# Patient Record
Sex: FEMALE | Race: WHITE | NOT HISPANIC OR LATINO | Employment: UNEMPLOYED | URBAN - METROPOLITAN AREA
[De-identification: names, ages, dates, MRNs, and addresses within clinical notes are randomized per-mention and may not be internally consistent; named-entity substitution may affect disease eponyms.]

---

## 2023-02-22 PROBLEM — R51.9 HEADACHE: Status: ACTIVE | Noted: 2023-02-22

## 2023-02-22 RX ORDER — VIT C/E/ZN/COPPR/LUTEIN/ZEAXAN 250MG-90MG
CAPSULE ORAL
COMMUNITY
Start: 2020-05-06

## 2023-02-22 RX ORDER — ZINC GLUCONATE 50 MG
TABLET ORAL
COMMUNITY
Start: 2020-05-07

## 2023-02-22 RX ORDER — DOXYLAMINE SUCCINATE AND PYRIDOXINE HYDROCHLORIDE, DELAYED RELEASE TABLETS 10 MG/10 MG 10; 10 MG/1; MG/1
TABLET, DELAYED RELEASE ORAL
COMMUNITY
Start: 2020-05-06 | End: 2023-12-31 | Stop reason: HOSPADM

## 2023-03-09 ENCOUNTER — APPOINTMENT (OUTPATIENT)
Dept: PRIMARY CARE | Facility: CLINIC | Age: 33
End: 2023-03-09
Payer: COMMERCIAL

## 2023-03-23 ENCOUNTER — OFFICE VISIT (OUTPATIENT)
Dept: PRIMARY CARE | Facility: CLINIC | Age: 33
End: 2023-03-23
Payer: COMMERCIAL

## 2023-03-23 VITALS
DIASTOLIC BLOOD PRESSURE: 77 MMHG | SYSTOLIC BLOOD PRESSURE: 122 MMHG | OXYGEN SATURATION: 97 % | WEIGHT: 259 LBS | HEIGHT: 67 IN | BODY MASS INDEX: 40.65 KG/M2 | HEART RATE: 81 BPM

## 2023-03-23 DIAGNOSIS — Z13.220 LIPID SCREENING: ICD-10-CM

## 2023-03-23 DIAGNOSIS — I83.93 VARICOSE VEINS OF BOTH LOWER EXTREMITIES, UNSPECIFIED WHETHER COMPLICATED: ICD-10-CM

## 2023-03-23 DIAGNOSIS — L72.9 BENIGN CYST OF SKIN: ICD-10-CM

## 2023-03-23 DIAGNOSIS — E66.01 CLASS 3 SEVERE OBESITY WITHOUT SERIOUS COMORBIDITY WITH BODY MASS INDEX (BMI) OF 40.0 TO 44.9 IN ADULT, UNSPECIFIED OBESITY TYPE (MULTI): Primary | ICD-10-CM

## 2023-03-23 DIAGNOSIS — R73.01 IFG (IMPAIRED FASTING GLUCOSE): ICD-10-CM

## 2023-03-23 DIAGNOSIS — D23.4 BENIGN NEOPLASM OF SCALP: ICD-10-CM

## 2023-03-23 DIAGNOSIS — D64.9 ANEMIA, UNSPECIFIED TYPE: ICD-10-CM

## 2023-03-23 LAB
CHOLESTEROL (MG/DL) IN SER/PLAS: 200 MG/DL (ref 0–199)
CHOLESTEROL IN HDL (MG/DL) IN SER/PLAS: 53.4 MG/DL
CHOLESTEROL/HDL RATIO: 3.7
ERYTHROCYTE DISTRIBUTION WIDTH (RATIO) BY AUTOMATED COUNT: 13.9 % (ref 11.5–14.5)
ERYTHROCYTE MEAN CORPUSCULAR HEMOGLOBIN CONCENTRATION (G/DL) BY AUTOMATED: 30 G/DL (ref 32–36)
ERYTHROCYTE MEAN CORPUSCULAR VOLUME (FL) BY AUTOMATED COUNT: 86 FL (ref 80–100)
ERYTHROCYTES (10*6/UL) IN BLOOD BY AUTOMATED COUNT: 5.05 X10E12/L (ref 4–5.2)
ESTIMATED AVERAGE GLUCOSE FOR HBA1C: 94 MG/DL
FERRITIN (UG/LL) IN SER/PLAS: 46 UG/L (ref 8–150)
HEMATOCRIT (%) IN BLOOD BY AUTOMATED COUNT: 43.6 % (ref 36–46)
HEMOGLOBIN (G/DL) IN BLOOD: 13.1 G/DL (ref 12–16)
HEMOGLOBIN A1C/HEMOGLOBIN TOTAL IN BLOOD: 4.9 %
IRON (UG/DL) IN SER/PLAS: 81 UG/DL (ref 35–150)
IRON BINDING CAPACITY (UG/DL) IN SER/PLAS: 378 UG/DL (ref 240–445)
IRON SATURATION (%) IN SER/PLAS: 21 % (ref 25–45)
LDL: 121 MG/DL (ref 0–99)
LEUKOCYTES (10*3/UL) IN BLOOD BY AUTOMATED COUNT: 7.9 X10E9/L (ref 4.4–11.3)
NRBC (PER 100 WBCS) BY AUTOMATED COUNT: 0 /100 WBC (ref 0–0)
PLATELETS (10*3/UL) IN BLOOD AUTOMATED COUNT: 227 X10E9/L (ref 150–450)
TRANSFERRIN (MG/DL) IN SER/PLAS: 271 MG/DL (ref 200–360)
TRIGLYCERIDE (MG/DL) IN SER/PLAS: 129 MG/DL (ref 0–149)
VLDL: 26 MG/DL (ref 0–40)

## 2023-03-23 PROCEDURE — 84443 ASSAY THYROID STIM HORMONE: CPT

## 2023-03-23 PROCEDURE — 84466 ASSAY OF TRANSFERRIN: CPT

## 2023-03-23 PROCEDURE — 83036 HEMOGLOBIN GLYCOSYLATED A1C: CPT

## 2023-03-23 PROCEDURE — 99204 OFFICE O/P NEW MOD 45 MIN: CPT | Performed by: INTERNAL MEDICINE

## 2023-03-23 PROCEDURE — 82728 ASSAY OF FERRITIN: CPT

## 2023-03-23 PROCEDURE — 1036F TOBACCO NON-USER: CPT | Performed by: INTERNAL MEDICINE

## 2023-03-23 PROCEDURE — 3008F BODY MASS INDEX DOCD: CPT | Performed by: INTERNAL MEDICINE

## 2023-03-23 PROCEDURE — 83540 ASSAY OF IRON: CPT

## 2023-03-23 PROCEDURE — 36415 COLL VENOUS BLD VENIPUNCTURE: CPT | Performed by: INTERNAL MEDICINE

## 2023-03-23 PROCEDURE — 85027 COMPLETE CBC AUTOMATED: CPT

## 2023-03-23 PROCEDURE — 80061 LIPID PANEL: CPT

## 2023-03-23 NOTE — PROGRESS NOTES
"Subjective   Patient ID: Lenora Mchugh is a 32 y.o. female who presents for Establish Care (New patient here to establish care).    HPI   C/O difficulties losing weight, exercises regularly.  \"I am not interested in injectable medications\".  Inquiring about \"bumps\" on the sculp and on the back.  Reports varicose veins in lower extremities, requesting compression stocking order.    Review of Systems  Weight gain  Scalp and back bumps.  Varicose veins    Objective   Pulse 81   Ht 1.702 m (5' 7\")   Wt 117 kg (259 lb)   LMP 02/23/2023 (Approximate)   SpO2 97%   BMI 40.57 kg/m²     Physical Exam  NAD. Cooperative.  Head: non tender nodule, hard in consistency, round, well defined, sub centimeter right parietal area  Similar, smaller in size nodule palpable below the scapula on the left side.  Neck: WNL  Lungs CTA  Heart: RRR  Abdomen: WNL  Musculoskeletal system: WNL  Neurologic exam: WNL    Assessment/Plan   Diagnoses and all orders for this visit:  Class 3 severe obesity without serious comorbidity with body mass index (BMI) of 40.0 to 44.9 in adult, unspecified obesity type (CMS/HCC)  -     TSH; Future  -     TSH; Future  Varicose veins of both lower extremities, unspecified whether complicated  -     Compression Stockings 15-20 mmHg  Anemia, unspecified type  -     CBC; Future  -     Iron and TIBC; Future  -     Ferritin; Future  -     Transferrin; Future  IFG (impaired fasting glucose)  -     Hemoglobin A1C; Future  Lipid screening  -     Lipid panel; Future  Discussed health benefits from whole food plant based diet. Bibliotherapy: The [place] Study, VICTORINA Covarrubias, PhD, suggested   Discussed weight goals, recommended gradual weight loss by daily caloric reduction and low to moderate intensity exercise as tolerated, optimum BMI is 27 or 175 lb, 84 lb.    Benign sculp and skin cysts, reassurance.     "

## 2023-03-24 LAB — THYROTROPIN (MIU/L) IN SER/PLAS BY DETECTION LIMIT <= 0.05 MIU/L: 2.38 MIU/L (ref 0.44–3.98)

## 2023-05-23 LAB
CHLAMYDIA TRACH., AMPLIFIED: NEGATIVE
N. GONORRHEA, AMPLIFIED: NEGATIVE
URINE CULTURE: NORMAL

## 2023-06-13 ENCOUNTER — APPOINTMENT (OUTPATIENT)
Dept: LAB | Facility: LAB | Age: 33
End: 2023-06-13
Payer: COMMERCIAL

## 2023-06-13 LAB
ABO GROUP (TYPE) IN BLOOD: NORMAL
ANTIBODY SCREEN: NORMAL
CALCIDIOL (25 OH VITAMIN D3) (NG/ML) IN SER/PLAS: 52 NG/ML
ERYTHROCYTE DISTRIBUTION WIDTH (RATIO) BY AUTOMATED COUNT: 13.6 % (ref 11.5–14.5)
ERYTHROCYTE MEAN CORPUSCULAR HEMOGLOBIN CONCENTRATION (G/DL) BY AUTOMATED: 32.4 G/DL (ref 32–36)
ERYTHROCYTE MEAN CORPUSCULAR VOLUME (FL) BY AUTOMATED COUNT: 82 FL (ref 80–100)
ERYTHROCYTES (10*6/UL) IN BLOOD BY AUTOMATED COUNT: 4.95 X10E12/L (ref 4–5.2)
ESTIMATED AVERAGE GLUCOSE FOR HBA1C: 100 MG/DL
HEMATOCRIT (%) IN BLOOD BY AUTOMATED COUNT: 40.4 % (ref 36–46)
HEMOGLOBIN (G/DL) IN BLOOD: 13.1 G/DL (ref 12–16)
HEMOGLOBIN A1C/HEMOGLOBIN TOTAL IN BLOOD: 5.1 %
HEPATITIS B VIRUS SURFACE AG PRESENCE IN SERUM: NONREACTIVE
HEPATITIS C VIRUS AB PRESENCE IN SERUM: NONREACTIVE
HIV 1/ 2 AG/AB SCREEN: NONREACTIVE
LEUKOCYTES (10*3/UL) IN BLOOD BY AUTOMATED COUNT: 8 X10E9/L (ref 4.4–11.3)
PLATELETS (10*3/UL) IN BLOOD AUTOMATED COUNT: 197 X10E9/L (ref 150–450)
REFLEX ADDED, ANEMIA PANEL: NORMAL
RH FACTOR: NORMAL
RUBELLA VIRUS IGG AB: POSITIVE
SYPHILIS TOTAL AB: NONREACTIVE

## 2023-06-13 PROCEDURE — 83020 HEMOGLOBIN ELECTROPHORESIS: CPT | Performed by: PATHOLOGY

## 2023-06-14 LAB
CHLAMYDIA TRACH., AMPLIFIED: NEGATIVE
N. GONORRHEA, AMPLIFIED: NEGATIVE

## 2023-06-16 LAB
HEMOGLOBIN A2: 2.7 %
HEMOGLOBIN A: 97 %
HEMOGLOBIN F: 0.3 %
HEMOGLOBIN IDENTIFICATION INTERPRETATION: NORMAL
PATH REVIEW-HGB IDENTIFICATION: NORMAL

## 2023-11-30 ENCOUNTER — ROUTINE PRENATAL (OUTPATIENT)
Dept: OBSTETRICS AND GYNECOLOGY | Facility: CLINIC | Age: 33
End: 2023-11-30
Payer: COMMERCIAL

## 2023-11-30 VITALS — WEIGHT: 269 LBS | BODY MASS INDEX: 42.13 KG/M2 | SYSTOLIC BLOOD PRESSURE: 130 MMHG | DIASTOLIC BLOOD PRESSURE: 72 MMHG

## 2023-11-30 DIAGNOSIS — Z34.83 ENCOUNTER FOR SUPERVISION OF NORMAL PREGNANCY IN MULTIGRAVIDA IN THIRD TRIMESTER (HHS-HCC): Primary | ICD-10-CM

## 2023-11-30 DIAGNOSIS — Z3A.35 35 WEEKS GESTATION OF PREGNANCY (HHS-HCC): ICD-10-CM

## 2023-11-30 DIAGNOSIS — O21.9 NAUSEA AND VOMITING IN PREGNANCY (HHS-HCC): ICD-10-CM

## 2023-11-30 PROCEDURE — 99213 OFFICE O/P EST LOW 20 MIN: CPT

## 2023-11-30 PROCEDURE — 87081 CULTURE SCREEN ONLY: CPT

## 2023-11-30 RX ORDER — ONDANSETRON 4 MG/1
4 TABLET, FILM COATED ORAL EVERY 12 HOURS PRN
Qty: 6 TABLET | Refills: 0 | Status: SHIPPED | OUTPATIENT
Start: 2023-11-30 | End: 2023-12-03

## 2023-11-30 NOTE — PROGRESS NOTES
Assessment/Plan   Diagnoses and all orders for this visit:  Encounter for supervision of normal pregnancy in multigravida in third trimester  35 weeks gestation of pregnancy  -     Group B Streptococcus (GBS) Prenatal Screen, Culture  Nausea and vomiting in pregnancy  -     ondansetron (Zofran) 4 mg tablet; Take 1 tablet (4 mg) by mouth every 12 hours if needed for nausea or vomiting for up to 3 days.      Reviewed at home blood sugar and blood pressure monitoring, all WNL  Patient has been doing home monitoring for most of the pregnancy. Has blood pressure cuff, glucometer, and doppler at home. Wants to continue self monitoring until delivery. Encouraged patient to schedule at least one more prenatal visit before ASHLEE.   Discussed routine GBS screening, to be completed today  Reviewed s/sx of PTL, warning signs, fetal movement counts, and when to call provider  Follow up in 1 week for a routine prenatal visit.    Marietta Cade, STEFANIE-RASHAAD    Subjective     Lenora Mchugh is a 33 y.o.  at 35w0d with a working estimated date of delivery of 2024, by Ultrasound who presents for a routine prenatal visit. She denies vaginal bleeding, leakage of fluid, decreased fetal movements, or contractions.  Pt endorses no questions or concerns.     Her pregnancy is complicated by:  Pregnancy Problems (from 23 to present)       No problems associated with this episode.             Objective   Physical Exam:   Weight: 122 kg (269 lb)  Expected Total Weight Gain: 5 kg (11 lb)-9 kg (19 lb)   Pregravid BMI: 40.09  BP: 130/72  Fetal Heart Rate: 144 Fundal Height (cm): 35 cm Presentation: Vertex

## 2023-12-03 LAB — GP B STREP GENITAL QL CULT: NORMAL

## 2023-12-21 ENCOUNTER — ROUTINE PRENATAL (OUTPATIENT)
Dept: OBSTETRICS AND GYNECOLOGY | Facility: CLINIC | Age: 33
End: 2023-12-21
Payer: COMMERCIAL

## 2023-12-21 VITALS — SYSTOLIC BLOOD PRESSURE: 120 MMHG | DIASTOLIC BLOOD PRESSURE: 80 MMHG | WEIGHT: 267 LBS | BODY MASS INDEX: 41.82 KG/M2

## 2023-12-21 DIAGNOSIS — Z34.83 ENCOUNTER FOR SUPERVISION OF NORMAL PREGNANCY IN MULTIGRAVIDA IN THIRD TRIMESTER (HHS-HCC): Primary | ICD-10-CM

## 2023-12-21 DIAGNOSIS — O36.8131 DECREASED FETAL MOVEMENTS IN THIRD TRIMESTER, FETUS 1 OF MULTIPLE GESTATION (HHS-HCC): ICD-10-CM

## 2023-12-21 DIAGNOSIS — Z3A.38 38 WEEKS GESTATION OF PREGNANCY (HHS-HCC): ICD-10-CM

## 2023-12-21 DIAGNOSIS — O26.849 FETAL SIZE INCONSISTENT WITH DATES (HHS-HCC): ICD-10-CM

## 2023-12-21 PROCEDURE — 99213 OFFICE O/P EST LOW 20 MIN: CPT

## 2023-12-21 NOTE — PROGRESS NOTES
Assessment/Plan   Diagnoses and all orders for this visit:  Encounter for supervision of normal pregnancy in multigravida in third trimester  38 weeks gestation of pregnancy  Decreased fetal movements in third trimester, fetus 1 of multiple gestation  Fetal size inconsistent with dates  -     US MAC OB imaging order; Future       surveillance weekly for BMI, patient agreeable to weekly NSTs until delivery    Reviewed s/sx of labor, warning signs, fetal movement counts, and when to call provider  Follow up in 1 week for a routine prenatal visit.    KEYSHA Sherman    Subjective     Lenora Mchugh is a 33 y.o.  at 38w0d with a working estimated date of delivery of 2024, by Ultrasound who presents for a routine prenatal visit. She denies vaginal bleeding, leakage of fluid, decreased fetal movements, or contractions.  Pt complains of:  decreased fetal movement.     Her pregnancy is complicated by:  Pregnancy Problems (from 23 to present)       No problems associated with this episode.             Objective   Physical Exam:   Weight: 121 kg (267 lb)  Expected Total Weight Gain: 5 kg (11 lb)-9 kg (19 lb)   Pregravid BMI: 40.09  BP: 120/80  Fetal Heart Rate: 145 Fundal Height (cm): 34 cm Presentation: Vertex  Dilation: 2 Effacement (%): 50 Fetal Station: -3    NST:   Baseline: 140  Variability: Moderate  Decelerations: None  Accelerations: Present   Reactive    KEYSHA Sherman

## 2023-12-22 ENCOUNTER — TELEPHONE (OUTPATIENT)
Dept: OBSTETRICS AND GYNECOLOGY | Facility: CLINIC | Age: 33
End: 2023-12-22

## 2023-12-22 ENCOUNTER — HOSPITAL ENCOUNTER (OUTPATIENT)
Dept: RADIOLOGY | Facility: HOSPITAL | Age: 33
Discharge: HOME | End: 2023-12-22
Payer: COMMERCIAL

## 2023-12-22 ENCOUNTER — APPOINTMENT (OUTPATIENT)
Dept: RADIOLOGY | Facility: CLINIC | Age: 33
End: 2023-12-22
Payer: COMMERCIAL

## 2023-12-22 ENCOUNTER — TELEMEDICINE (OUTPATIENT)
Dept: MATERNAL FETAL MEDICINE | Facility: HOSPITAL | Age: 33
End: 2023-12-22
Payer: COMMERCIAL

## 2023-12-22 DIAGNOSIS — O36.5990 IUGR (INTRAUTERINE GROWTH RESTRICTION) AFFECTING CARE OF MOTHER (HHS-HCC): Primary | ICD-10-CM

## 2023-12-22 DIAGNOSIS — O26.849 FETAL SIZE INCONSISTENT WITH DATES (HHS-HCC): ICD-10-CM

## 2023-12-22 PROCEDURE — 76816 OB US FOLLOW-UP PER FETUS: CPT

## 2023-12-22 PROCEDURE — 76816 OB US FOLLOW-UP PER FETUS: CPT | Performed by: OBSTETRICS & GYNECOLOGY

## 2023-12-22 PROCEDURE — 76821 MIDDLE CEREBRAL ARTERY ECHO: CPT | Performed by: OBSTETRICS & GYNECOLOGY

## 2023-12-22 PROCEDURE — 76820 UMBILICAL ARTERY ECHO: CPT

## 2023-12-22 PROCEDURE — 76819 FETAL BIOPHYS PROFIL W/O NST: CPT

## 2023-12-22 PROCEDURE — 76819 FETAL BIOPHYS PROFIL W/O NST: CPT | Performed by: OBSTETRICS & GYNECOLOGY

## 2023-12-22 PROCEDURE — 99202 OFFICE O/P NEW SF 15 MIN: CPT | Performed by: OBSTETRICS & GYNECOLOGY

## 2023-12-22 PROCEDURE — 76820 UMBILICAL ARTERY ECHO: CPT | Performed by: OBSTETRICS & GYNECOLOGY

## 2023-12-22 NOTE — PROGRESS NOTES
Ultrasound findings:  Cardiac and facial views have never been completed. BMI 35, no aneuploidy screening with previous SGA infant.  S < D in office.  -Decreased interval fetal growth. The Ac is at the <1%, and the EFW is at the 10% percentile  -No malformations identified on a limited survey  -Normal amniotic fluid volume  -Normal doppler indices with an RI at the 4% percentile  The patient was informed of the above findings. (see consultation note)  Findings today are not suggestive of uteroplacental insufficiency at this time.  Delivery at 39 weeks recommended vs repeat doppler, BPP and AFV evaluation scheduled weekly until delivery.  Thank you for allowing us to participate in the care of your patient    Counseling provided:  The following was discussed with the patient and her partner:  -Fetal growth restriction is a diagnosis based solely on fetal size.  However, true fetal growth restriction would be a fetus not meeting his growth potential.  As growth potential is unknown for each particular fetus, size is used as a inaccurate surrogate.  -As she had a previous FT infant who was of a similar size, this is likely constitutional  -The concept of percentiles and growth patterns were discussed.  Normal growth velocity is a strong indicator of normal outcome. Growth velocity cannot be evaluated on a single scan without a proximate scan  -Sometimes there is evolving uteroplacental insufficiency.  The best test to determine if there is uteroplacental insufficiency is umbilical artery Doppler resistance.  This is repeated on a weekly basis.  -However the patient is at term.  In this situation, delivery at 39 weeks is recommended as the ARRIVE study shows there is not an advantage to waiting.  The risk for  section is not increased.  outcomes are the same, however outcomes worsen after 39 4/7 weeks.  -Delivery does not have to be at a tertiary care center unless there are other complications    The  patient expressed an understanding of the above and agrees with the following plan of management:  Delivery at 39 weeks.      Video consultation was performed with the physician at [facility] and the patient at [facility]

## 2023-12-22 NOTE — TELEPHONE ENCOUNTER
Marietta,    I spoke with pt and she stated she needs another U/S  and BPP. Pt also wanted to discuss stripping of the membranes. Pt informed if any problems before next appt may call back or go to Labor and Delivery. Pt verbalized understanding

## 2023-12-26 ENCOUNTER — TELEPHONE (OUTPATIENT)
Dept: OBSTETRICS AND GYNECOLOGY | Facility: CLINIC | Age: 33
End: 2023-12-26
Payer: COMMERCIAL

## 2023-12-26 ENCOUNTER — HOSPITAL ENCOUNTER (OUTPATIENT)
Dept: RADIOLOGY | Facility: HOSPITAL | Age: 33
Discharge: HOME | End: 2023-12-26
Payer: COMMERCIAL

## 2023-12-26 PROCEDURE — 76819 FETAL BIOPHYS PROFIL W/O NST: CPT | Performed by: OBSTETRICS & GYNECOLOGY

## 2023-12-26 PROCEDURE — 76820 UMBILICAL ARTERY ECHO: CPT | Performed by: OBSTETRICS & GYNECOLOGY

## 2023-12-26 PROCEDURE — 76819 FETAL BIOPHYS PROFIL W/O NST: CPT

## 2023-12-26 NOTE — TELEPHONE ENCOUNTER
Pt verified by name and .  Pt is aware nurse spoke to Marietta Cade regarding IOL.  Pt has appt tomorrow with Marietta Cade.  Pt has no questions at this time.

## 2023-12-27 ENCOUNTER — ROUTINE PRENATAL (OUTPATIENT)
Dept: OBSTETRICS AND GYNECOLOGY | Facility: CLINIC | Age: 33
End: 2023-12-27
Payer: COMMERCIAL

## 2023-12-27 DIAGNOSIS — Z36.4 ULTRASOUND FOR ANTENATAL SCREENING FOR FETAL GROWTH RESTRICTION (HHS-HCC): ICD-10-CM

## 2023-12-27 DIAGNOSIS — Z3A.38 38 WEEKS GESTATION OF PREGNANCY (HHS-HCC): Primary | ICD-10-CM

## 2023-12-27 DIAGNOSIS — O36.5931 MATERNAL CARE FOR POOR FETAL GROWTH IN THIRD TRIMESTER, FETUS 1 OF MULTIPLE GESTATION (HHS-HCC): ICD-10-CM

## 2023-12-27 PROBLEM — O36.5990: Status: ACTIVE | Noted: 2023-12-27

## 2023-12-27 PROCEDURE — 99213 OFFICE O/P EST LOW 20 MIN: CPT

## 2023-12-28 ENCOUNTER — APPOINTMENT (OUTPATIENT)
Dept: OBSTETRICS AND GYNECOLOGY | Facility: HOSPITAL | Age: 33
End: 2023-12-28
Payer: COMMERCIAL

## 2023-12-28 ENCOUNTER — HOSPITAL ENCOUNTER (INPATIENT)
Facility: HOSPITAL | Age: 33
LOS: 3 days | Discharge: HOME | End: 2023-12-31
Attending: OBSTETRICS & GYNECOLOGY
Payer: COMMERCIAL

## 2023-12-28 ENCOUNTER — APPOINTMENT (OUTPATIENT)
Dept: OBSTETRICS AND GYNECOLOGY | Facility: CLINIC | Age: 33
End: 2023-12-28
Payer: COMMERCIAL

## 2023-12-28 DIAGNOSIS — O36.5931 MATERNAL CARE FOR POOR FETAL GROWTH IN THIRD TRIMESTER, FETUS 1 OF MULTIPLE GESTATION (HHS-HCC): ICD-10-CM

## 2023-12-28 PROBLEM — Z34.90 ENCOUNTER FOR INDUCTION OF LABOR (HHS-HCC): Status: ACTIVE | Noted: 2023-12-28

## 2023-12-28 LAB
ABO GROUP (TYPE) IN BLOOD: NORMAL
ALBUMIN SERPL BCP-MCNC: 3.8 G/DL (ref 3.4–5)
ALP SERPL-CCNC: 181 U/L (ref 33–110)
ALT SERPL W P-5'-P-CCNC: 11 U/L (ref 7–45)
ANION GAP SERPL CALC-SCNC: 15 MMOL/L (ref 10–20)
ANTIBODY SCREEN: NORMAL
AST SERPL W P-5'-P-CCNC: 11 U/L (ref 9–39)
BILIRUB SERPL-MCNC: 0.3 MG/DL (ref 0–1.2)
BUN SERPL-MCNC: 7 MG/DL (ref 6–23)
CALCIUM SERPL-MCNC: 9.7 MG/DL (ref 8.6–10.6)
CHLORIDE SERPL-SCNC: 105 MMOL/L (ref 98–107)
CO2 SERPL-SCNC: 23 MMOL/L (ref 21–32)
CREAT SERPL-MCNC: 0.45 MG/DL (ref 0.5–1.05)
CREAT UR-MCNC: 107.3 MG/DL (ref 20–320)
ERYTHROCYTE [DISTWIDTH] IN BLOOD BY AUTOMATED COUNT: 15.5 % (ref 11.5–14.5)
GFR SERPL CREATININE-BSD FRML MDRD: >90 ML/MIN/1.73M*2
GLUCOSE SERPL-MCNC: 74 MG/DL (ref 74–99)
HCT VFR BLD AUTO: 40.3 % (ref 36–46)
HGB BLD-MCNC: 12.6 G/DL (ref 12–16)
LDH SERPL L TO P-CCNC: 144 U/L (ref 84–246)
MCH RBC QN AUTO: 25.3 PG (ref 26–34)
MCHC RBC AUTO-ENTMCNC: 31.3 G/DL (ref 32–36)
MCV RBC AUTO: 81 FL (ref 80–100)
NRBC BLD-RTO: 0 /100 WBCS (ref 0–0)
PLATELET # BLD AUTO: 161 X10*3/UL (ref 150–450)
POTASSIUM SERPL-SCNC: 3.9 MMOL/L (ref 3.5–5.3)
PROT SERPL-MCNC: 6.6 G/DL (ref 6.4–8.2)
PROT UR-ACNC: 20 MG/DL (ref 5–24)
PROT/CREAT UR: 0.19 MG/MG CREAT (ref 0–0.17)
RBC # BLD AUTO: 4.99 X10*6/UL (ref 4–5.2)
RH FACTOR (ANTIGEN D): NORMAL
SODIUM SERPL-SCNC: 139 MMOL/L (ref 136–145)
T PALLIDUM AB SER QL: NONREACTIVE
URATE SERPL-MCNC: 4.5 MG/DL (ref 2.3–6.7)
WBC # BLD AUTO: 10.5 X10*3/UL (ref 4.4–11.3)

## 2023-12-28 PROCEDURE — 84550 ASSAY OF BLOOD/URIC ACID: CPT

## 2023-12-28 PROCEDURE — 86780 TREPONEMA PALLIDUM: CPT

## 2023-12-28 PROCEDURE — 85027 COMPLETE CBC AUTOMATED: CPT

## 2023-12-28 PROCEDURE — 84075 ASSAY ALKALINE PHOSPHATASE: CPT

## 2023-12-28 PROCEDURE — 86901 BLOOD TYPING SEROLOGIC RH(D): CPT

## 2023-12-28 PROCEDURE — 36415 COLL VENOUS BLD VENIPUNCTURE: CPT

## 2023-12-28 PROCEDURE — 1120000001 HC OB PRIVATE ROOM DAILY

## 2023-12-28 PROCEDURE — 83615 LACTATE (LD) (LDH) ENZYME: CPT

## 2023-12-28 PROCEDURE — 2500000004 HC RX 250 GENERAL PHARMACY W/ HCPCS (ALT 636 FOR OP/ED)

## 2023-12-28 PROCEDURE — 82570 ASSAY OF URINE CREATININE: CPT

## 2023-12-28 PROCEDURE — 99223 1ST HOSP IP/OBS HIGH 75: CPT

## 2023-12-28 RX ORDER — METHYLERGONOVINE MALEATE 0.2 MG/ML
0.2 INJECTION INTRAVENOUS ONCE AS NEEDED
Status: DISCONTINUED | OUTPATIENT
Start: 2023-12-28 | End: 2023-12-29

## 2023-12-28 RX ORDER — CARBOPROST TROMETHAMINE 250 UG/ML
250 INJECTION, SOLUTION INTRAMUSCULAR ONCE AS NEEDED
Status: DISCONTINUED | OUTPATIENT
Start: 2023-12-28 | End: 2023-12-29

## 2023-12-28 RX ORDER — SERTRALINE HYDROCHLORIDE 25 MG/1
25 TABLET, FILM COATED ORAL DAILY
COMMUNITY

## 2023-12-28 RX ORDER — OXYTOCIN/0.9 % SODIUM CHLORIDE 30/500 ML
60 PLASTIC BAG, INJECTION (ML) INTRAVENOUS ONCE AS NEEDED
Status: DISCONTINUED | OUTPATIENT
Start: 2023-12-28 | End: 2023-12-29

## 2023-12-28 RX ORDER — LIDOCAINE HYDROCHLORIDE 10 MG/ML
30 INJECTION INFILTRATION; PERINEURAL ONCE AS NEEDED
Status: DISCONTINUED | OUTPATIENT
Start: 2023-12-28 | End: 2023-12-29

## 2023-12-28 RX ORDER — TRANEXAMIC ACID 100 MG/ML
1000 INJECTION, SOLUTION INTRAVENOUS ONCE AS NEEDED
Status: DISCONTINUED | OUTPATIENT
Start: 2023-12-28 | End: 2023-12-29

## 2023-12-28 RX ORDER — METOCLOPRAMIDE HYDROCHLORIDE 5 MG/ML
10 INJECTION INTRAMUSCULAR; INTRAVENOUS EVERY 6 HOURS PRN
Status: DISCONTINUED | OUTPATIENT
Start: 2023-12-28 | End: 2023-12-29

## 2023-12-28 RX ORDER — MISOPROSTOL 200 UG/1
800 TABLET ORAL ONCE AS NEEDED
Status: DISCONTINUED | OUTPATIENT
Start: 2023-12-28 | End: 2023-12-29

## 2023-12-28 RX ORDER — LOPERAMIDE HYDROCHLORIDE 2 MG/1
4 CAPSULE ORAL EVERY 2 HOUR PRN
Status: DISCONTINUED | OUTPATIENT
Start: 2023-12-28 | End: 2023-12-29

## 2023-12-28 RX ORDER — SODIUM CHLORIDE, SODIUM LACTATE, POTASSIUM CHLORIDE, CALCIUM CHLORIDE 600; 310; 30; 20 MG/100ML; MG/100ML; MG/100ML; MG/100ML
125 INJECTION, SOLUTION INTRAVENOUS CONTINUOUS
Status: DISCONTINUED | OUTPATIENT
Start: 2023-12-28 | End: 2023-12-29

## 2023-12-28 RX ORDER — METOCLOPRAMIDE 10 MG/1
10 TABLET ORAL EVERY 6 HOURS PRN
Status: DISCONTINUED | OUTPATIENT
Start: 2023-12-28 | End: 2023-12-29

## 2023-12-28 RX ORDER — ONDANSETRON 4 MG/1
4 TABLET, FILM COATED ORAL EVERY 8 HOURS PRN
COMMUNITY
End: 2023-12-31 | Stop reason: HOSPADM

## 2023-12-28 RX ORDER — TERBUTALINE SULFATE 1 MG/ML
0.25 INJECTION SUBCUTANEOUS ONCE AS NEEDED
Status: DISCONTINUED | OUTPATIENT
Start: 2023-12-28 | End: 2023-12-29

## 2023-12-28 RX ORDER — SERTRALINE HYDROCHLORIDE 25 MG/1
25 TABLET, FILM COATED ORAL DAILY
Status: DISCONTINUED | OUTPATIENT
Start: 2023-12-28 | End: 2023-12-29

## 2023-12-28 RX ORDER — NIFEDIPINE 10 MG/1
10 CAPSULE ORAL ONCE AS NEEDED
Status: DISCONTINUED | OUTPATIENT
Start: 2023-12-28 | End: 2023-12-29

## 2023-12-28 RX ORDER — ONDANSETRON HYDROCHLORIDE 2 MG/ML
4 INJECTION, SOLUTION INTRAVENOUS EVERY 6 HOURS PRN
Status: DISCONTINUED | OUTPATIENT
Start: 2023-12-28 | End: 2023-12-29

## 2023-12-28 RX ORDER — OXYTOCIN 10 [USP'U]/ML
10 INJECTION, SOLUTION INTRAMUSCULAR; INTRAVENOUS ONCE AS NEEDED
Status: DISCONTINUED | OUTPATIENT
Start: 2023-12-28 | End: 2023-12-29

## 2023-12-28 RX ORDER — LABETALOL HYDROCHLORIDE 5 MG/ML
20 INJECTION, SOLUTION INTRAVENOUS ONCE AS NEEDED
Status: DISCONTINUED | OUTPATIENT
Start: 2023-12-28 | End: 2023-12-29

## 2023-12-28 RX ORDER — ONDANSETRON 4 MG/1
4 TABLET, FILM COATED ORAL EVERY 6 HOURS PRN
Status: DISCONTINUED | OUTPATIENT
Start: 2023-12-28 | End: 2023-12-29

## 2023-12-28 RX ORDER — OXYTOCIN/0.9 % SODIUM CHLORIDE 30/500 ML
2-30 PLASTIC BAG, INJECTION (ML) INTRAVENOUS CONTINUOUS
Status: DISCONTINUED | OUTPATIENT
Start: 2023-12-28 | End: 2023-12-29

## 2023-12-28 RX ORDER — HYDRALAZINE HYDROCHLORIDE 20 MG/ML
5 INJECTION INTRAMUSCULAR; INTRAVENOUS ONCE AS NEEDED
Status: DISCONTINUED | OUTPATIENT
Start: 2023-12-28 | End: 2023-12-29

## 2023-12-28 RX ADMIN — SODIUM CHLORIDE, POTASSIUM CHLORIDE, SODIUM LACTATE AND CALCIUM CHLORIDE 125 ML/HR: 600; 310; 30; 20 INJECTION, SOLUTION INTRAVENOUS at 20:13

## 2023-12-28 RX ADMIN — Medication 2 MILLI-UNITS/MIN: at 20:13

## 2023-12-28 RX ADMIN — ONDANSETRON 4 MG: 2 INJECTION INTRAMUSCULAR; INTRAVENOUS at 22:49

## 2023-12-28 SDOH — SOCIAL STABILITY: SOCIAL INSECURITY: DOES ANYONE TRY TO KEEP YOU FROM HAVING/CONTACTING OTHER FRIENDS OR DOING THINGS OUTSIDE YOUR HOME?: NO

## 2023-12-28 SDOH — HEALTH STABILITY: MENTAL HEALTH: SUICIDAL BEHAVIOR (LIFETIME): NO

## 2023-12-28 SDOH — ECONOMIC STABILITY: HOUSING INSECURITY: DO YOU FEEL UNSAFE GOING BACK TO THE PLACE WHERE YOU ARE LIVING?: NO

## 2023-12-28 SDOH — SOCIAL STABILITY: SOCIAL INSECURITY: HAVE YOU HAD THOUGHTS OF HARMING ANYONE ELSE?: NO

## 2023-12-28 SDOH — SOCIAL STABILITY: SOCIAL INSECURITY: HAS ANYONE EVER THREATENED TO HURT YOUR FAMILY OR YOUR PETS?: NO

## 2023-12-28 SDOH — SOCIAL STABILITY: SOCIAL INSECURITY: ARE YOU OR HAVE YOU BEEN THREATENED OR ABUSED PHYSICALLY, EMOTIONALLY, OR SEXUALLY BY ANYONE?: NO

## 2023-12-28 SDOH — SOCIAL STABILITY: SOCIAL INSECURITY: ARE THERE ANY APPARENT SIGNS OF INJURIES/BEHAVIORS THAT COULD BE RELATED TO ABUSE/NEGLECT?: NO

## 2023-12-28 SDOH — HEALTH STABILITY: MENTAL HEALTH: NON-SPECIFIC ACTIVE SUICIDAL THOUGHTS (PAST 1 MONTH): NO

## 2023-12-28 SDOH — SOCIAL STABILITY: SOCIAL INSECURITY: DO YOU FEEL ANYONE HAS EXPLOITED OR TAKEN ADVANTAGE OF YOU FINANCIALLY OR OF YOUR PERSONAL PROPERTY?: NO

## 2023-12-28 SDOH — HEALTH STABILITY: MENTAL HEALTH: WERE YOU ABLE TO COMPLETE ALL THE BEHAVIORAL HEALTH SCREENINGS?: YES

## 2023-12-28 SDOH — HEALTH STABILITY: MENTAL HEALTH: WISH TO BE DEAD (PAST 1 MONTH): NO

## 2023-12-28 SDOH — SOCIAL STABILITY: SOCIAL INSECURITY: PHYSICAL ABUSE: DENIES

## 2023-12-28 SDOH — SOCIAL STABILITY: SOCIAL INSECURITY: VERBAL ABUSE: DENIES

## 2023-12-28 SDOH — SOCIAL STABILITY: SOCIAL INSECURITY: ABUSE SCREEN: ADULT

## 2023-12-28 ASSESSMENT — LIFESTYLE VARIABLES
SKIP TO QUESTIONS 9-10: 1
HOW OFTEN DO YOU HAVE A DRINK CONTAINING ALCOHOL: NEVER
HOW OFTEN DO YOU HAVE 6 OR MORE DRINKS ON ONE OCCASION: NEVER
HOW OFTEN DO YOU HAVE 6 OR MORE DRINKS ON ONE OCCASION: NEVER
AUDIT-C TOTAL SCORE: 0
HOW MANY STANDARD DRINKS CONTAINING ALCOHOL DO YOU HAVE ON A TYPICAL DAY: PATIENT DOES NOT DRINK
AUDIT-C TOTAL SCORE: 0
HOW OFTEN DO YOU HAVE A DRINK CONTAINING ALCOHOL: NEVER

## 2023-12-28 ASSESSMENT — PAIN SCALES - GENERAL
PAINLEVEL_OUTOF10: 3
PAINLEVEL_OUTOF10: 5 - MODERATE PAIN
PAINLEVEL_OUTOF10: 3
PAINLEVEL_OUTOF10: 3
PAINLEVEL_OUTOF10: 2
PAINLEVEL_OUTOF10: 3
PAINLEVEL_OUTOF10: 3
PAINLEVEL_OUTOF10: 2

## 2023-12-28 ASSESSMENT — PATIENT HEALTH QUESTIONNAIRE - PHQ9
1. LITTLE INTEREST OR PLEASURE IN DOING THINGS: NOT AT ALL
1. LITTLE INTEREST OR PLEASURE IN DOING THINGS: NOT AT ALL
SUM OF ALL RESPONSES TO PHQ9 QUESTIONS 1 & 2: 0
2. FEELING DOWN, DEPRESSED OR HOPELESS: NOT AT ALL
2. FEELING DOWN, DEPRESSED OR HOPELESS: NOT AT ALL
SUM OF ALL RESPONSES TO PHQ9 QUESTIONS 1 & 2: 0

## 2023-12-28 ASSESSMENT — ACTIVITIES OF DAILY LIVING (ADL): LACK_OF_TRANSPORTATION: NO

## 2023-12-28 NOTE — CARE PLAN
The patient's goals for the shift include      The clinical goals for the shift include FHR remains reasurring throughout IOL    Over the shift, the patient made progress towards goal, FHR remains reasurring, CRB placed and now out, pt tolerating induction.

## 2023-12-28 NOTE — PROGRESS NOTES
Assessment/Plan   Diagnoses and all orders for this visit:  38 weeks gestation of pregnancy  Ultrasound for  screening for fetal growth restriction  Maternal care for poor fetal growth in third trimester, fetus 1 of multiple gestation  -     Labor Induction; Future  -     Fetal nonstress test; Future      NST reactive, BPP    surveillance weekly for poor fetal growth in the third trimester  Discussed expectant management vs. scheduling term IOL, patient had IOL scheduled for tomorrow at 2pm for fetal growth. Patient is unsure of attendance. Discussed recommendation.    Reviewed s/sx of labor, warning signs, fetal movement counts, and when to call provider    STEFANIE Sherman-RASHAAD Serrano     Lenora Mchugh is a 33 y.o.  at 38w6d with a working estimated date of delivery of 2024, by Ultrasound who presents for a routine prenatal visit. She denies vaginal bleeding, leakage of fluid, decreased fetal movements, or contractions.  Pt endorses no questions or concerns.      Her pregnancy is complicated by:  Pregnancy Problems (from 23 to present)      Decreased interval fetal growth.   The Ac is at the <1%, and the EFW is at the 10% percentile              Objective   Physical Exam:      Expected Total Weight Gain: 5 kg (11 lb)-9 kg (19 lb)   Pregravid BMI: 40.09      Reactive NST    Baseline 140, +accels, -decels. Moderate variability

## 2023-12-28 NOTE — H&P
Obstetrical Admission History and Physical     Lenora Mchugh is a 33 y.o.  at 39w2d. ASHLEE: 2024, by Last Menstrual Period. Estimated fetal weight: 2750g. She has had limited prenatal care with Marietta Cade CNM.    Assessment    Lenora Mchugh is a 33 y.o.  at 39w2d. ASHLEE: 2024, by Last Menstrual Period.   FHT Category 2 - overall reassuring   IOL  - crb in place   Limited prenatal care  Newly diagnosed gHTN r/o preeclampsia   FGR  Obesity       Plan    -Admit to labor and delivery  -Monitor vital signs per unit protocol  -Routine labs ordered, stat HELLP labs added.  -Encourage frequent position changes  -Regular diet, clear liquid diet with epidural  -Continuous fetal monitoring  -Pain management per patient request  -Continue assessment of maternal and fetal well-being  -Recheck as clinically indicted by maternal or fetal status  -Anticipate SVB  -Discussed no 1 hour gtt and no documented blood glucose values during prenatal course with MD team - will monitor POCT every 4 hours and collaborate on result mgt.    - MD team aware of severe range Bps with mild range rechecks, will continue to monitor.  -Dr Piper and aware of admission        STEFANIE Barnhart-RASHAAD    Subjective     Chief Complaint: Encounter for IOL in the setting of FGR     Lenora is a 33 year old  @ 39.2 weeks by LMP c/w 14.2 week ultra sound who presents for IOL in the setting of FGR. Last ultrasound on  EFW was 2750g EFW 10%. AC <1%.  She has limited prenatal care and did not complete second trimester labs. Pt reports she was home monitoring blood glucose values in th second trimester.     Pt has had 2 severe range blood pressure values about 1 hour and 20 min apart with mild range rechecks. She denies HA, visual changes, abd/epigastric pain. She denies a history of HTN during this pregnancy or in previous pregnancies.     Pt has a history of depression for which she is taking daily Zoloft.         Pregnancy Problems  (from 23 to present)       Problem Noted Resolved    Encounter for induction of labor 2023 by KEYSHA Barnhart No    Priority:  Medium      Poor clinical fetal growth 2023 by KEYSHA Sherman No    Priority:  Medium               Obstetrical History   OB History    Para Term  AB Living   5 4 4     4   SAB IAB Ectopic Multiple Live Births           4      # Outcome Date GA Lbr Eder/2nd Weight Sex Delivery Anes PTL Lv   5 Current            4 Term 20    F Vag-Spont None N JOEY   3 Term 17 40w0d   M Vag-Spont None N JOEY   2 Term 02/08/15 40w0d  2750 g M Vag-Spont None N JOEY   1 Term 13 40w0d  3005 g M Vag-Spont None N JOEY       Past Medical History  History reviewed. No pertinent past medical history.     Past Surgical History   History reviewed. No pertinent surgical history.    Social History  Social History     Tobacco Use    Smoking status: Never     Passive exposure: Never    Smokeless tobacco: Never   Substance Use Topics    Alcohol use: Not Currently     Substance and Sexual Activity   Drug Use Not Currently       Allergies  Clindamycin, Nut - unspecified, and Sulfamethoxazole-trimethoprim     Medications  Medications Prior to Admission   Medication Sig Dispense Refill Last Dose    cholecalciferol (Vitamin D-3) 25 MCG (1000 UT) capsule Take by mouth.   More than a month    doxylamine-pyridoxine, vit B6, 10-10 mg tablet,delayed release (DR/EC) Take by mouth.   More than a month    fish oil concentrate (Omega-3) 120-180 mg capsule Take by mouth.   More than a month    L. acidophilus/Bifid. animalis 32 billion cell capsule Take by mouth. CAPS   More than a month    magnesium oxide 500 mg capsule Take by mouth. Take as directed   More than a month    ondansetron (Zofran) 4 mg tablet Take 1 tablet (4 mg) by mouth every 8 hours if needed for nausea or vomiting.   2023    PNV no.95/ferrous fum/folic ac (PRENATAL ORAL) Take by mouth.   More than a month     sertraline (Zoloft) 25 mg tablet Take 1 tablet (25 mg) by mouth once daily.   12/28/2023       Objective    Last Vitals  Temp Pulse Resp BP MAP O2 Sat   36.7 °C (98.1 °F) 78 20 (!) 140/65   97 %     Physical Examination    GENERAL: Examination reveals a well developed, well nourished, gravid female in no acute distress and whose affect is appropriate. She is alert and cooperative.  HEENT: PERRLA. External ears normal. Nose normal, no erythema or discharge. Mouth and throat clear.  NECK: not examined  LUNGS:  normal resp effort   ABDOMEN: soft, gravid, nontender, nondistended, no abnormal masses, no epigastric pain, fundus soft, nontender, size equal dates, FHT present  FHR is  , with Accelerations, and a Category I tracing.    [place] reading:    The fetus is in a vertex presentation, determined by ultrasound, vaginal exam, and Leopold's maneuver  Current Estimated Fetal Weight 2750g established by ultrasound  VAGINA: normal appearing vagina with normal color and discharge and no lesions noted  CERVIX: 1 cm dilated, 50 % effaced, -3 station; MEMBRANES are Intact.   On SSE small cervical polyp noted during CRB placement.   EXTREMITIES: no redness or tenderness in the calves or thighs, no edema, no limitation in range of motion  SKIN: normal coloration and turgor, no rashes  NEUROLOGICAL: alert, oriented, normal speech, no focal findings or movement disorder noted  PSYCHOLOGICAL: awake and alert; oriented to person, place, and time    Fetal Monitoring      Baseline FHR: 140 per minute  Variability: moderate  Accelerations: yes  Decelerations: variable and late - intermittent variable and late x1   TOCO: irregular     Cervical Exam:  1 cm dilated, 50 effaced, -3 station    Membrane status: intact    CRB inserted through cervical os and inflated with 60cc saline @ 1645.   Placement of balloon confirmed with gentle traction.   Patient tolerated well.        Lab Review  Labs in chart were reviewed.  No results found for:  "\"ABO\", \"LABRH\", \"ABSCRN\"  Lab Results   Component Value Date    WBC 10.5 12/28/2023    HGB 12.6 12/28/2023    HCT 40.3 12/28/2023     12/28/2023     No results found for: \"GRPBSTREP\"  Lab Results   Component Value Date    GLUCOSE 74 12/28/2023     12/28/2023    K 3.9 12/28/2023     12/28/2023    CO2 23 12/28/2023    ANIONGAP 15 12/28/2023    BUN 7 12/28/2023    CREATININE 0.45 (L) 12/28/2023    EGFR >90 12/28/2023    CALCIUM 9.7 12/28/2023    ALBUMIN 3.8 12/28/2023    PROT 6.6 12/28/2023    ALKPHOS 181 (H) 12/28/2023    ALT 11 12/28/2023    AST 11 12/28/2023    BILITOT 0.3 12/28/2023       "

## 2023-12-29 ENCOUNTER — ANESTHESIA (OUTPATIENT)
Dept: OBSTETRICS AND GYNECOLOGY | Facility: HOSPITAL | Age: 33
End: 2023-12-29
Payer: COMMERCIAL

## 2023-12-29 ENCOUNTER — ANESTHESIA EVENT (OUTPATIENT)
Dept: OBSTETRICS AND GYNECOLOGY | Facility: HOSPITAL | Age: 33
End: 2023-12-29
Payer: COMMERCIAL

## 2023-12-29 PROCEDURE — 7100000016 HC LABOR RECOVERY PER HOUR

## 2023-12-29 PROCEDURE — 59409 OBSTETRICAL CARE: CPT

## 2023-12-29 PROCEDURE — 1210000001 HC SEMI-PRIVATE ROOM DAILY

## 2023-12-29 PROCEDURE — 2500000005 HC RX 250 GENERAL PHARMACY W/O HCPCS: Performed by: STUDENT IN AN ORGANIZED HEALTH CARE EDUCATION/TRAINING PROGRAM

## 2023-12-29 PROCEDURE — 2500000004 HC RX 250 GENERAL PHARMACY W/ HCPCS (ALT 636 FOR OP/ED): Performed by: ADVANCED PRACTICE MIDWIFE

## 2023-12-29 PROCEDURE — 01967 NEURAXL LBR ANES VAG DLVR: CPT | Performed by: ANESTHESIOLOGY

## 2023-12-29 PROCEDURE — 7210000002 HC LABOR PER HOUR

## 2023-12-29 PROCEDURE — 3E0E77Z INTRODUCTION OF ELECTROLYTIC AND WATER BALANCE SUBSTANCE INTO PRODUCTS OF CONCEPTION, VIA NATURAL OR ARTIFICIAL OPENING: ICD-10-PCS

## 2023-12-29 PROCEDURE — 2720000007 HC OR 272 NO HCPCS

## 2023-12-29 PROCEDURE — 10H07YZ INSERTION OF OTHER DEVICE INTO PRODUCTS OF CONCEPTION, VIA NATURAL OR ARTIFICIAL OPENING: ICD-10-PCS

## 2023-12-29 PROCEDURE — 2500000004 HC RX 250 GENERAL PHARMACY W/ HCPCS (ALT 636 FOR OP/ED)

## 2023-12-29 PROCEDURE — 88307 TISSUE EXAM BY PATHOLOGIST: CPT | Mod: TC,SUR

## 2023-12-29 PROCEDURE — 01967 NEURAXL LBR ANES VAG DLVR: CPT | Performed by: ANESTHESIOLOGIST ASSISTANT

## 2023-12-29 PROCEDURE — 88307 TISSUE EXAM BY PATHOLOGIST: CPT | Performed by: PATHOLOGY

## 2023-12-29 PROCEDURE — 4A1H7FZ MONITORING OF PRODUCTS OF CONCEPTION, CARDIAC RHYTHM, VIA NATURAL OR ARTIFICIAL OPENING: ICD-10-PCS

## 2023-12-29 PROCEDURE — 2500000005 HC RX 250 GENERAL PHARMACY W/O HCPCS: Performed by: ADVANCED PRACTICE MIDWIFE

## 2023-12-29 PROCEDURE — 2500000005 HC RX 250 GENERAL PHARMACY W/O HCPCS: Performed by: ANESTHESIOLOGIST ASSISTANT

## 2023-12-29 PROCEDURE — 51701 INSERT BLADDER CATHETER: CPT

## 2023-12-29 PROCEDURE — 0HQ9XZZ REPAIR PERINEUM SKIN, EXTERNAL APPROACH: ICD-10-PCS

## 2023-12-29 RX ORDER — DIPHENHYDRAMINE HYDROCHLORIDE 50 MG/ML
25 INJECTION INTRAMUSCULAR; INTRAVENOUS ONCE
Status: COMPLETED | OUTPATIENT
Start: 2023-12-29 | End: 2023-12-29

## 2023-12-29 RX ORDER — ENOXAPARIN SODIUM 100 MG/ML
60 INJECTION SUBCUTANEOUS EVERY 24 HOURS
Status: DISCONTINUED | OUTPATIENT
Start: 2023-12-30 | End: 2023-12-31 | Stop reason: HOSPADM

## 2023-12-29 RX ORDER — OXYTOCIN/0.9 % SODIUM CHLORIDE 30/500 ML
60 PLASTIC BAG, INJECTION (ML) INTRAVENOUS ONCE AS NEEDED
Status: DISCONTINUED | OUTPATIENT
Start: 2023-12-29 | End: 2023-12-31 | Stop reason: HOSPADM

## 2023-12-29 RX ORDER — NIFEDIPINE 10 MG/1
10 CAPSULE ORAL ONCE AS NEEDED
Status: DISCONTINUED | OUTPATIENT
Start: 2023-12-29 | End: 2023-12-31 | Stop reason: HOSPADM

## 2023-12-29 RX ORDER — FENTANYL/BUPIVACAINE/NS/PF 2MCG/ML-.1
PLASTIC BAG, INJECTION (ML) INJECTION AS NEEDED
Status: DISCONTINUED | OUTPATIENT
Start: 2023-12-29 | End: 2023-12-29

## 2023-12-29 RX ORDER — DIPHENHYDRAMINE HYDROCHLORIDE 50 MG/ML
25 INJECTION INTRAMUSCULAR; INTRAVENOUS EVERY 6 HOURS PRN
Status: DISCONTINUED | OUTPATIENT
Start: 2023-12-29 | End: 2023-12-31 | Stop reason: HOSPADM

## 2023-12-29 RX ORDER — FENTANYL/BUPIVACAINE/NS/PF 2MCG/ML-.1
PLASTIC BAG, INJECTION (ML) INJECTION CONTINUOUS PRN
Status: DISCONTINUED | OUTPATIENT
Start: 2023-12-29 | End: 2023-12-29

## 2023-12-29 RX ORDER — METHYLERGONOVINE MALEATE 0.2 MG/ML
0.2 INJECTION INTRAVENOUS ONCE AS NEEDED
Status: DISCONTINUED | OUTPATIENT
Start: 2023-12-29 | End: 2023-12-31 | Stop reason: HOSPADM

## 2023-12-29 RX ORDER — LIDOCAINE 560 MG/1
1 PATCH PERCUTANEOUS; TOPICAL; TRANSDERMAL
Status: DISCONTINUED | OUTPATIENT
Start: 2023-12-29 | End: 2023-12-31 | Stop reason: HOSPADM

## 2023-12-29 RX ORDER — LABETALOL HYDROCHLORIDE 5 MG/ML
20 INJECTION, SOLUTION INTRAVENOUS ONCE AS NEEDED
Status: DISCONTINUED | OUTPATIENT
Start: 2023-12-29 | End: 2023-12-31 | Stop reason: HOSPADM

## 2023-12-29 RX ORDER — DIPHENHYDRAMINE HCL 25 MG
25 CAPSULE ORAL EVERY 6 HOURS PRN
Status: DISCONTINUED | OUTPATIENT
Start: 2023-12-29 | End: 2023-12-31 | Stop reason: HOSPADM

## 2023-12-29 RX ORDER — IBUPROFEN 600 MG/1
600 TABLET ORAL EVERY 6 HOURS
Status: DISCONTINUED | OUTPATIENT
Start: 2023-12-29 | End: 2023-12-31 | Stop reason: HOSPADM

## 2023-12-29 RX ORDER — SIMETHICONE 80 MG
80 TABLET,CHEWABLE ORAL 4 TIMES DAILY PRN
Status: DISCONTINUED | OUTPATIENT
Start: 2023-12-29 | End: 2023-12-31 | Stop reason: HOSPADM

## 2023-12-29 RX ORDER — OXYTOCIN 10 [USP'U]/ML
10 INJECTION, SOLUTION INTRAMUSCULAR; INTRAVENOUS ONCE AS NEEDED
Status: DISCONTINUED | OUTPATIENT
Start: 2023-12-29 | End: 2023-12-31 | Stop reason: HOSPADM

## 2023-12-29 RX ORDER — HYDRALAZINE HYDROCHLORIDE 20 MG/ML
5 INJECTION INTRAMUSCULAR; INTRAVENOUS ONCE AS NEEDED
Status: DISCONTINUED | OUTPATIENT
Start: 2023-12-29 | End: 2023-12-31 | Stop reason: HOSPADM

## 2023-12-29 RX ORDER — ADHESIVE BANDAGE
10 BANDAGE TOPICAL
Status: DISCONTINUED | OUTPATIENT
Start: 2023-12-29 | End: 2023-12-31 | Stop reason: HOSPADM

## 2023-12-29 RX ORDER — MISOPROSTOL 200 UG/1
800 TABLET ORAL ONCE AS NEEDED
Status: DISCONTINUED | OUTPATIENT
Start: 2023-12-29 | End: 2023-12-31 | Stop reason: HOSPADM

## 2023-12-29 RX ORDER — LOPERAMIDE HYDROCHLORIDE 2 MG/1
4 CAPSULE ORAL EVERY 2 HOUR PRN
Status: DISCONTINUED | OUTPATIENT
Start: 2023-12-29 | End: 2023-12-31 | Stop reason: HOSPADM

## 2023-12-29 RX ORDER — ENOXAPARIN SODIUM 100 MG/ML
60 INJECTION SUBCUTANEOUS EVERY 24 HOURS
Status: DISCONTINUED | OUTPATIENT
Start: 2023-12-30 | End: 2023-12-29

## 2023-12-29 RX ORDER — ACETAMINOPHEN 325 MG/1
975 TABLET ORAL EVERY 6 HOURS
Status: DISCONTINUED | OUTPATIENT
Start: 2023-12-29 | End: 2023-12-31 | Stop reason: HOSPADM

## 2023-12-29 RX ORDER — LIDOCAINE HCL/EPINEPHRINE/PF 2%-1:200K
VIAL (ML) INJECTION AS NEEDED
Status: DISCONTINUED | OUTPATIENT
Start: 2023-12-29 | End: 2023-12-29

## 2023-12-29 RX ORDER — POLYETHYLENE GLYCOL 3350 17 G/17G
17 POWDER, FOR SOLUTION ORAL 2 TIMES DAILY PRN
Status: DISCONTINUED | OUTPATIENT
Start: 2023-12-29 | End: 2023-12-31 | Stop reason: HOSPADM

## 2023-12-29 RX ORDER — BISACODYL 10 MG/1
10 SUPPOSITORY RECTAL DAILY PRN
Status: DISCONTINUED | OUTPATIENT
Start: 2023-12-29 | End: 2023-12-31 | Stop reason: HOSPADM

## 2023-12-29 RX ORDER — ONDANSETRON HYDROCHLORIDE 2 MG/ML
4 INJECTION, SOLUTION INTRAVENOUS EVERY 6 HOURS PRN
Status: DISCONTINUED | OUTPATIENT
Start: 2023-12-29 | End: 2023-12-31 | Stop reason: HOSPADM

## 2023-12-29 RX ORDER — SERTRALINE HYDROCHLORIDE 25 MG/1
25 TABLET, FILM COATED ORAL DAILY
Status: DISCONTINUED | OUTPATIENT
Start: 2023-12-29 | End: 2023-12-31 | Stop reason: HOSPADM

## 2023-12-29 RX ORDER — ONDANSETRON 4 MG/1
4 TABLET, FILM COATED ORAL EVERY 6 HOURS PRN
Status: DISCONTINUED | OUTPATIENT
Start: 2023-12-29 | End: 2023-12-31 | Stop reason: HOSPADM

## 2023-12-29 RX ORDER — CARBOPROST TROMETHAMINE 250 UG/ML
250 INJECTION, SOLUTION INTRAMUSCULAR ONCE AS NEEDED
Status: DISCONTINUED | OUTPATIENT
Start: 2023-12-29 | End: 2023-12-31 | Stop reason: HOSPADM

## 2023-12-29 RX ORDER — DIPHENHYDRAMINE HYDROCHLORIDE 50 MG/ML
25 INJECTION INTRAMUSCULAR; INTRAVENOUS EVERY 6 HOURS PRN
Status: DISCONTINUED | OUTPATIENT
Start: 2023-12-29 | End: 2023-12-29

## 2023-12-29 RX ORDER — TRANEXAMIC ACID 100 MG/ML
1000 INJECTION, SOLUTION INTRAVENOUS ONCE AS NEEDED
Status: DISCONTINUED | OUTPATIENT
Start: 2023-12-29 | End: 2023-12-31 | Stop reason: HOSPADM

## 2023-12-29 RX ADMIN — DIPHENHYDRAMINE HYDROCHLORIDE 25 MG: 50 INJECTION INTRAMUSCULAR; INTRAVENOUS at 08:40

## 2023-12-29 RX ADMIN — SODIUM CHLORIDE, POTASSIUM CHLORIDE, SODIUM LACTATE AND CALCIUM CHLORIDE 125 ML/HR: 600; 310; 30; 20 INJECTION, SOLUTION INTRAVENOUS at 08:20

## 2023-12-29 RX ADMIN — Medication: at 01:00

## 2023-12-29 RX ADMIN — Medication 14 ML/HR: at 03:09

## 2023-12-29 RX ADMIN — ONDANSETRON 4 MG: 2 INJECTION INTRAMUSCULAR; INTRAVENOUS at 06:28

## 2023-12-29 RX ADMIN — SERTRALINE 25 MG: 25 TABLET, FILM COATED ORAL at 23:26

## 2023-12-29 RX ADMIN — Medication 5 ML: at 03:09

## 2023-12-29 RX ADMIN — SODIUM CHLORIDE, SODIUM LACTATE, POTASSIUM CHLORIDE, AND CALCIUM CHLORIDE 500 ML: 600; 310; 30; 20 INJECTION, SOLUTION INTRAVENOUS at 02:40

## 2023-12-29 RX ADMIN — Medication 5 ML: at 03:08

## 2023-12-29 RX ADMIN — DIPHENHYDRAMINE HYDROCHLORIDE 25 MG: 50 INJECTION INTRAMUSCULAR; INTRAVENOUS at 04:38

## 2023-12-29 RX ADMIN — LIDOCAINE HYDROCHLORIDE,EPINEPHRINE BITARTRATE 5 ML: 20; .005 INJECTION, SOLUTION EPIDURAL; INFILTRATION; INTRACAUDAL; PERINEURAL at 11:15

## 2023-12-29 SDOH — HEALTH STABILITY: MENTAL HEALTH: CURRENT SMOKER: 0

## 2023-12-29 ASSESSMENT — PAIN SCALES - GENERAL
PAINLEVEL_OUTOF10: 5 - MODERATE PAIN
PAINLEVEL_OUTOF10: 0 - NO PAIN
PAINLEVEL_OUTOF10: 9
PAINLEVEL_OUTOF10: 0 - NO PAIN
PAINLEVEL_OUTOF10: 8
PAINLEVEL_OUTOF10: 0 - NO PAIN
PAINLEVEL_OUTOF10: 7

## 2023-12-29 NOTE — PROGRESS NOTES
Using nitrous with good effect. Felt slight urge to push    Fht 140, mod veronica, +accels, +non-recurrent variable decels  [place]: 2-4. Pit @4mu/min (down from 8 per pt's request when ctx's were closer)  Ve attempted in kneeling position--baby still high    Iup at 39.3  IOL, latent phase  Fht category 2, overall reassuring for variability and accels  Fgr  Gbs neg    Continue current course  Anticipate tre Castro, luisa

## 2023-12-29 NOTE — CARE PLAN
Problem: Vaginal Birth or  Section  Goal: Fetal and maternal status remain reassuring during the birth process  Outcome: Progressing  Flowsheets (Taken 2023)  Fetal and maternal status remain reassuring during the birth process:   Monitor vital signs   Monitor uterine activity   Monitor fetal heart rate   Monitor labor progression (Vaginal delivery)   Med administration/monitoring of effect  Goal: Tolerate CRB for IOL placement maintenance until dislodgement/removal 12hrs after placement  Outcome: Progressing  Flowsheets (Taken 2023)  Tolerates CRB for IOL placement/maintenance until dislodgement/removal 12hrs after placement:   Med administration/monitoring of effect   Fetal monitoring - continuous  Goal: Prevention of malpresentation/labor dystocia through delivery  Outcome: Progressing  Flowsheets (Taken 2023)  Prevention of malpresentation/labor dystocia through delivery: Positioning, turning, and/or use of birthing ball assistance  Goal: Demonstrates labor coping techniques through delivery  Outcome: Progressing  Flowsheets (Taken 2023)  Demonstrates labor coping techniques through delivery:   Positioning, turning, and/or use of birthing ball assistance   Breathing/relaxation assistance  Goal: Minimal s/sx of HDP and BP<160/110  Outcome: Progressing  Flowsheets (Taken 2023)  Minimal s/sx of HDP and BP <160/110:   Med administration/monitoring of effect   Monitor QBL and vital signs     Problem: Pain - Adult  Goal: Verbalizes/displays adequate comfort level or baseline comfort level  Outcome: Progressing  Flowsheets (Taken 2023)  Verbalizes/displays adequate comfort level or baseline comfort level:   Encourage patient to monitor pain and request assistance   Assess pain using appropriate pain scale   Administer analgesics based on type and severity of pain and evaluate response   Implement non-pharmacological measures as appropriate and  evaluate response   Consider cultural and social influences on pain and pain management     Problem: Safety - Adult  Goal: Free from fall injury  Outcome: Progressing  Flowsheets (Taken 12/29/2023 1322)  Free from fall injury:   Instruct family/caregiver on patient safety   Based on caregiver fall risk screen, instruct family/caregiver to ask for assistance with transferring infant if caregiver noted to have fall risk factors   The patient's goals for the shift include      The clinical goals for the shift include FHR remains reassuring throughout IOL    Over the shift, the patient did not make progress toward the following goals. Barriers to progression include none. Recommendations to address these barriers include n/a.

## 2023-12-29 NOTE — CARE PLAN
The patient's goals for the shift include visit infant in NICU    The clinical goals for the shift include pain management    Patient remained free from falls/injury throughout shift. VSS, pain well controlled without tylenol and motrin. Bonding with infant in NICU. Patient is pumping independently on an appropriate schedule. Resting comfortably in bed and declines needs at this time.

## 2023-12-29 NOTE — SIGNIFICANT EVENT
Called by RN due to inability to trace FHR externally.  FSE placed; deep recurrent variables continue to be noted.  Pitocin turned off; amnioinfusion continues.  SVE /-1.  Counseled patient on possibility of  or assisted vaginal delivery if category 2 tracing continues and either patient is remote from delivery or patient is 10cm and there is fetal intolerance of pushing.  All questions answered and patient verbalizes understanding of status.  Dr. Reeves aware.  Continue to monitor patient and fetal status.    Emmy Delgado, STEFANIE-RASHAAD

## 2023-12-29 NOTE — ANESTHESIA PROCEDURE NOTES
Epidural Block    Patient location during procedure: floor  Start time: 12/29/2023 2:55 AM  Reason for block: labor analgesia  Staffing  Performed: YONI   Authorized by: MANDEEP Ferreira    Performed by: MANDEEP Ferreira    Preanesthetic Checklist  Completed: patient identified, IV checked, risks and benefits discussed, surgical consent, pre-op evaluation, timeout performed and sterile techniques followed  Block Timeout  RN/Licensed healthcare professional reads aloud to the Anesthesia provider and entire team: Patient identity, procedure with side and site, patient position, and as applicable the availability of implants/special equipment/special requirements.  Patient on coagulant treatment: no  Timeout performed at: 12/29/2023 2:58 AM  Block Placement  Patient position: sitting  Prep: ChloraPrep  Sterility prep: cap, drape, gloves, hand and mask  Sedation level: no sedation  Patient monitoring: blood pressure, continuous pulse oximetry and heart rate  Approach: midline  Local numbing: lidocaine 1% to skin and subcutaneous tissues  Vertebral space: lumbar  Lumbar location: L3-L4  Epidural  Loss of resistance technique: saline  Guidance: landmark technique        Needle  Needle type: Tuohy   Needle gauge: 17  Needle length: 10.2 cm  Needle insertion depth: 5.5 cm  Catheter type: multi-orifice  Catheter size: 19 G  Catheter at skin depth: 10.5 cm  Catheter securement method: clear occlusive dressing    Test dose: lidocaine 1.5% with epinephrine 1-to-200,000  Test dose given at 12/29/2023 3:04 AM  Test dose: lidocaine 1.5% with epinephrine 1-to-200,000  Test dose result: no positive test dose    PCEA  Medication concentration used: 0.044% Bupivacaine with 1.25 mcg/mL Fentanyl and [id number] Epinephrine  Dose (mL): 10  Lockout (minutes): 15  1-Hour Limit (boluses/hr): 4  Basal Rate: 14        Assessment  Sensory level: T10 bilateral  Block outcome: patient comfortable  Number of attempts: 1  Events: no  positive test dose  Procedure assessment: patient tolerated procedure well with no immediate complications

## 2023-12-29 NOTE — PROGRESS NOTES
Pt a bit  more uncomfortable. Wants ve now but arom after midnight    Fht 135, mod veronica, +accels, +non-recurrent variable decels  [place]: 2-4, pit @6mu/min  Ve: 4/long/-3 but applied to internal os    Iup at 39.2  IOL, latent phase  Fht cat 2, overall reassuring for variability & accels  Fgr  Gbs neg    Continue current course  Arom when pt allows  Anticipate     luisa Castro

## 2023-12-29 NOTE — ANESTHESIA PREPROCEDURE EVALUATION
Patient: Lenora Mchugh    Evaluation Method: In-person visit    Procedure Information    Date: 12/29/23  Procedure: Labor Analgesia         Relevant Problems   No relevant active problems       Clinical information reviewed:   Tobacco  Allergies  Meds   Med Hx  Surg Hx   Fam Hx          NPO Detail:  NPO/Void Status  Date of Last Liquid: 12/28/23  Time of Last Liquid: 1400  Date of Last Solid: 12/28/23  Time of Last Solid: 1400         OB/Gyn Evaluation    Present Pregnancy    Patient is pregnant now.   Obstetric History                Physical Exam    Airway  Mallampati: II  TM distance: >3 FB  Neck ROM: full     Cardiovascular   Rhythm: regular  Rate: normal     Dental    Pulmonary    Abdominal            Anesthesia Plan    ASA 2     epidural     The patient is not a current smoker.    Anesthetic plan and risks discussed with patient.  Use of blood products discussed with patient who consented to blood products.    Plan discussed with attending.

## 2023-12-29 NOTE — PROGRESS NOTES
Assessment    33 y.o.  at 39w2d  FHT Category 1  IOL  GBS negative   FGR   Limited prenatal care  gHTN r/o pre eclampsia       Plan      Encourage frequent position changes as tolerated  Encourage ambulation as tolerated  Maternal repositioning  Start/Continue pitocin per protocol  Continue assessment of maternal and fetal wellbeing  Recheck as clinically indicated by maternal or fetal status  Anticipate active phase of labor  Anticipate NSVB  Continue to monitor POCT and collaborate with results   Continue to monitor blood pressures       Mecca Laird, APRN-RASHAAD    Subjective:  Lenora Mchugh is resting on birthing ball, up at bedside. Intermittent severe range Bps with mild range rechecks, pt remains free of s/s. HELLP labs reviewed and negative. CRB out since . Pt requesting cervical exam.     Objective:  Fetal Monitoring      Baseline FHR: 135 per minute  Variability: moderate  Accelerations: yes  Decelerations: none  TOCO: irregular    Cervical Exam:  3 cm dilated, 60 effaced, -3 station    Membrane status: intact      Vitals:    23 1758 23 1813 23 1828 23 1858   BP: 125/74 121/69 139/63 (!) 141/67   Pulse: 78 73 85 85   Resp:   18    Temp:   36.6 °C (97.9 °F)    TempSrc:   Temporal    SpO2:   98%    Weight:       Height:

## 2023-12-29 NOTE — PROGRESS NOTES
Pt feeling few contractions. Is ready to start pitocin.    Fht 130, mod veronica, +accels, +non-recurrent variable decels  [place]: irreg  Ve: def'd (last exam approx 1845  3/60/-3)    Iup at 39.2  IOL, favorable cervix  Fht category 2, overall reassuring for variability and accels  Gbs neg  Ghtn, stable in the mild range    Start pit per protocol  Cefm  Arom if needed  Pain meds if desired    Anticipate shanna Castro cnm

## 2023-12-29 NOTE — PROGRESS NOTES
Assessment    33 y.o.  at 39w3d  IOL for FGR; on pitocin  FHT Category 2; overall reassuring with moderate variability  Latent labor  Limited prenatal care  Class I obesity  Depression; on Zoloft  GBS negative    Plan    Encourage frequent position changes as tolerated  Start/Continue pitocin per protocol  IUPC placed without difficulty  Amnioinfusion started  Discussed with patient reasoning for IUPC and amnioinfusion; patient verbalized understanding  Continue assessment of maternal and fetal wellbeing  Recheck as clinically indicated by maternal or fetal status  Patient status and plan of care reviewed with Dr. Reeves  Anticipate active phase of labor    Emmy Delgado, STEFANIE-RASHAAD    Subjective:  Lenora Mchugh is in semi fowlers, itchy but comfortable with epidural.    Objective:  Fetal Monitoring      Baseline FHR: 135 per minute  Variability: moderate  Accelerations: yes  Decelerations: variable  TOCO:  unable to trace well due to patient itching and habitus; placed IUPC to determine consistency and strength of contractions; contractions Q4-5mins and potentially adequate in strength; amnioinfusion started due to recurrent variables.  Will continue to monitor patient and fetal status.     Cervical Exam:  5 cm dilated, 70 effaced, -1 station    Membrane status: ruptured    Pitocin is at 4 mu/min.    Vitals:    23 0615 23 0620 23 0630 23 0722   BP:   124/81 122/60   Pulse: 102 105 84 95   Resp:   18 16   Temp:   36.9 °C (98.4 °F) 36.8 °C (98.2 °F)   TempSrc:   Temporal Temporal   SpO2: 97% 98%  98%   Weight:       Height:

## 2023-12-29 NOTE — L&D DELIVERY NOTE
OB Delivery Note  2023  Lenora Mchugh  33 y.o.   Vaginal, Spontaneous        Gestational Age: 39w3d  /Para:   Quantitative Blood Loss: Admission to Discharge: 111 mL (2023  2:04 PM - 2023  1:27 PM)    Berenice Mchugh [[id number]]      Labor Events    Rupture date/time: 2023 0000  Rupture type: Artificial  Fluid color: Clear, Meconium  Fluid odor: None  Labor type: Induced Onset of Labor  Labor allowed to proceed with plans for an attempted vaginal birth?: Yes  Induction: Oxytocin  Induction date/time: 2023 1404  Induction indications: Fetal Abnormality, Other  Complications: None       Labor Event Times    Labor onset date/time: 2023 1404  Dilation complete date/time: 2023 1002  Start pushing date/time: 2023 1003       Labor Length    1st stage: 19h 58m  2nd stage: 0h 23m  3rd stage: 0h 24m       Placenta    Placenta delivery date/time: 2023 1049  Placenta removal: Spontaneous  Placenta appearance: Intact  Placenta disposition: pathology       Cord    Vessels: 3 vessels  Delayed cord clamping?: Yes  Cord clamped date/time: 2023 1035  Cord blood disposition: Lab  Gases sent?: No  Stem cell collection (by provider): No       Lacerations    Episiotomy: None  Perineal laceration: 1st  Perineal laceration repaired?: Yes  Other lacerations?: No  Repair suture: 3-0 Synthetic Suture       Anesthesia    Method: Epidural       Operative Delivery    Forceps attempted?: No  Vacuum extractor attempted?: No       Shoulder Dystocia    Shoulder dystocia present?: No       [place] Delivery    Time head delivered: 2023 10:25:00  Birth date/time: 2023 10:25:00  Delivery type: Vaginal, Spontaneous  Complications: None       Resuscitation    Method: Tactile stimulation, Suctioning       Apgars    Living status: Living  Apgar Component Scores:  1 min.:  5 min.:  10 min.:  15 min.:  20 min.:    Skin color:  0  0       Heart rate:  2  2       Reflex  irritability:  2  2       Muscle tone:  1  2       Respiratory effort:  2  2       Total:  7  8       Apgars assigned by: SANYA BELTRAN       Delivery Providers    Delivering clinician: Emmy Delgado, STEFANIE-RASHAAD   Provider Role    Bonny Seals, RN Delivery Nurse    Amisha Beltran, RN Nursery Nurse     Resident    Carla Ewing, RN Delivery Nurse               Called to bedside by RN at 1000 due to prolonged decel with FHR zoila of 60bpm.  Patient found to be hands and knees at that time. Pitocin turned off, LR bolus started and terb 0.25mg given in the right arm.  SVE resulted in 9.5/100/0 station.  FHR remained 85-90bpm.  Dr. Galindo at bedside. Patient began to push in hands and knees with descent noted.  Peds team called for meconium and category 2 tracing.  Patient then repositioned to her back.  Upon repositioning, FHR returned to 125bpm.  Dr. Galindo left room as FHR returned to baseline and patient was pushing effectively.  IUPC taken out at 1010 due to patient feeling more vaginal and rectal pressure with fetal descent.  Patient 10/100/1 at that time.  Patient then began pushing in a squatting position, good fetal descent continued to be noted.  FHR continued to have recurrent variables during pushing.  Fetal head then began to crown and with perineal support and controled maternal pushing efforts, fetal head delivered and restituted to EDGAR position.  Nuchal times 1 noted; easily reduced with loop of umbilical cord noted upon delivery of fetal head next to left cheek.  With next maternal pushing effort, gentle downward traction applied with shoulders easily following.  Fetal body delivered and placed on maternal abdomen.  Spontaneous cry noted with suctioning and tactile stimulation.  Umbilical cord also noted to be loosely wrapped around left fetal arm and right fetal leg post delivery, all easily reducible on maternal abdomen.  Delayed cord clamping.  Apgars 7,8.  Peds left room; no assessment required on  warmer.  Umbilical cord clamped, patient's sister cut the umbilical cord, cord blood collected.  Placenta spontaneously delivered with gentle traction; placenta intact, 3 vessel cord.  Fundus firm at U with scant bleeding noted.  First degree laceration noted; repaired with 3-0 Vicryl; well approximated.  .   remains on maternal chest; all questions answered; patient and  stable.    Emmy Delgado, STEFANIE-RASHAAD

## 2023-12-30 PROCEDURE — 2500000004 HC RX 250 GENERAL PHARMACY W/ HCPCS (ALT 636 FOR OP/ED)

## 2023-12-30 PROCEDURE — 99213 OFFICE O/P EST LOW 20 MIN: CPT | Performed by: DOULA

## 2023-12-30 PROCEDURE — 2500000001 HC RX 250 WO HCPCS SELF ADMINISTERED DRUGS (ALT 637 FOR MEDICARE OP)

## 2023-12-30 PROCEDURE — 1210000001 HC SEMI-PRIVATE ROOM DAILY

## 2023-12-30 RX ADMIN — SERTRALINE 25 MG: 25 TABLET, FILM COATED ORAL at 20:58

## 2023-12-30 RX ADMIN — IBUPROFEN 600 MG: 600 TABLET, FILM COATED ORAL at 08:44

## 2023-12-30 RX ADMIN — IBUPROFEN 600 MG: 600 TABLET, FILM COATED ORAL at 20:57

## 2023-12-30 ASSESSMENT — PAIN SCALES - GENERAL
PAINLEVEL_OUTOF10: 0 - NO PAIN
PAINLEVEL_OUTOF10: 0 - NO PAIN
PAIN_LEVEL: 2
PAINLEVEL_OUTOF10: 0 - NO PAIN
PAINLEVEL_OUTOF10: 0 - NO PAIN

## 2023-12-30 NOTE — CARE PLAN
The patient's goals for the shift include be with baby as much as possible    The clinical goals for the shift include to bond with baby    Patient currently meeting postpartum milestones, vitals and assessment WDL.  Patient visits  at nicu daily.

## 2023-12-30 NOTE — PROGRESS NOTES
Postpartum Progress Note      Assessment/Plan     Lenora Mchugh is a 33 y.o., , who had a Vaginal, Spontaneous   delivery on 2023  at 39w3d and is now postpartum day 1. On entry patient is resting bedside in the NICU breastfeeding. Pt states that she feels great, she denies headaches, vision changes, SOB and chest pain. She notes that her pain is well controlled.       #Postpartum Day 1   - Continue routine postpartum care  - Pain well controlled on po medications  - DVT Score: 5 ; enoxaparin prophylactic dose daily while inpatient  - The patient's blood type is A POS. Rhogam is not indicated.  - Reviewed PP warning signs     #Gestational HTN   - Asymptomatic  -BP normotensive not on meds  - HELLP labs neg, P:C 0.19   - Will continue to monitor   - Needs BP cuff before discharge for home monitoring, discussed with patient   - Reviewed postpartum warning signs including headaches, vision changes, SOB, and chest pain, pt denies   - 1 week follow up with CNM     #Maternal Well-Being  - Emotional support provided  - Bonding with infant  - Contraception: is planning copper IUD at six weeks, she is considering depo for bridge until she is able to get IUD     #[place] Feeding  -Breastfeeding/pumping encouraged; lactation consult prn  -Pt is breastfeeding without complication, notes that she is having some nipple tenderness but is overall well   -Encouraged patient to seek lactation support if needed     #Dispo  - anticipate d/c on PPD #3 due to gHTN dx if continuing to meet all postpartum milestones  - for f/u 1 week with Primary OB provider      Principal Problem:    Encounter for induction of labor      Subjective   Meeting all postpartum milestones- ambulating independently, passing flatus, tolerating PO intake, lochia light, voiding spontaneously, and pain well controlled with PO meds.    Objective   Physical Exam:  General: well appearing, well nourished, postpartum  Obstetric: fundus firm below  umbilicus, lochia light  Skin: Warm, dry; no rashes/lesions/erythema  Breast: No masses, nipple discharge  Neuro: A/Ox3, conversational, no gross motor deficit   GI: no distension, appropriately tender, soft, +BS  Respiratory: Even and unlabored on RA, LSCTA BL  Cardiovascular: Trace BLE edema; No erythema, warmth  Psych: appropriate mood and affect    Last Vitals:  Temp Pulse Resp BP MAP Pulse Ox   36 °C (96.8 °F) 69 18 117/79   97 %       Vitals Min/Max Last 24 Hours:  Temp  Min: 36 °C (96.8 °F)  Max: 36.9 °C (98.4 °F)  Pulse  Min: 69  Max: 142  Resp  Min: 16  Max: 18  BP  Min: 95/54  Max: 128/69    Lab Data:  Lab Results   Component Value Date    WBC 10.5 12/28/2023    HGB 12.6 12/28/2023    HCT 40.3 12/28/2023     12/28/2023     KEYSHA Queen

## 2023-12-30 NOTE — CARE PLAN
The patient's goals for the shift include be with baby as much as possible    The clinical goals for the shift include pain management      Problem: Vaginal Birth or  Section  Goal: Minimal s/sx of HDP and BP<160/110  Outcome: Progressing     Problem: Pain - Adult  Goal: Verbalizes/displays adequate comfort level or baseline comfort level  Outcome: Progressing  Problem: Safety - Adult  Goal: Free from fall injury  Outcome: Progressing  Problem: Postpartum  Goal: Minimal s/sx of HDP and BP<160/110  Outcome: Progressing  Goal: Experiences normal postpartum course  Outcome: Progressing  Goal: Appropriate maternal -  bonding  Outcome: Progressing  Goal: Establish and maintain infant feeding pattern for adequate nutrition  Outcome: Progressing  Goal: Incisions, wounds, or drain sites healing without S/S of infection  Outcome: Progressing  Goal: No s/sx infection  Outcome: Progressing  Goal: No s/sx of hemorrhage  Outcome: Progressing

## 2023-12-30 NOTE — ANESTHESIA POSTPROCEDURE EVALUATION
Patient: Lenora Mchugh    Procedure Summary       Date: 23 Room / Location:     Anesthesia Start: 255 Anesthesia Stop:     Procedure: Labor Analgesia Diagnosis:     Scheduled Providers:  Responsible Provider: Patrice Martin MD PhD    Anesthesia Type: epidural ASA Status: 2            Anesthesia Type: epidural        Anesthesia Post Evaluation    Patient location during evaluation: bedside  Patient participation: complete - patient participated  Level of consciousness: awake  Pain score: 2  Pain management: adequate  Multimodal analgesia pain management approach  Airway patency: patent  Two or more strategies used to mitigate risk of obstructive sleep apnea  Cardiovascular status: acceptable  Respiratory status: acceptable  Hydration status: acceptable  Postoperative Nausea and Vomiting: none  Comments: Lenora Mchugh is a 33 y.o., , who had a Vaginal, Spontaneous delivery on 2023 at 39w3d and is now POD1.    She had Neuraxial Anesthesia without immediate complications noted.       Pain well controlled    -------------------------              23                    0500        -------------------------   BP:         117/79        Pulse:        69          Resp:         18          Temp:   36 °C (96.8 °F)   SpO2:         97%        -------------------------    Neuraxial site assessed. No visible redness or swelling or drainage. Patient able to ambulate and move all extremities without difficulty. Able to void. No complaints of nausea/vomiting. Tolerating PO intake well. No s/sx of PDPH.     Anesthesia will sign off     Dre Cox, DO          No notable events documented.

## 2023-12-30 NOTE — LACTATION NOTE
Lactation Consultant Note  Lactation Consultation       Maternal Information       Maternal Assessment       Infant Assessment       Feeding Assessment       LATCH TOOL       Breast Pump       Other OB Lactation Tools       Patient Follow-up       Other OB Lactation Documentation       Recommendations/Summary  Attempted to see mother to discuss breast feeding/pumping. Mother not in room, letter and  left.

## 2023-12-31 VITALS
DIASTOLIC BLOOD PRESSURE: 84 MMHG | RESPIRATION RATE: 20 BRPM | SYSTOLIC BLOOD PRESSURE: 152 MMHG | BODY MASS INDEX: 42.01 KG/M2 | OXYGEN SATURATION: 97 % | HEIGHT: 67 IN | WEIGHT: 267.64 LBS | HEART RATE: 97 BPM | TEMPERATURE: 98.1 F

## 2023-12-31 PROCEDURE — 2500000001 HC RX 250 WO HCPCS SELF ADMINISTERED DRUGS (ALT 637 FOR MEDICARE OP)

## 2023-12-31 RX ADMIN — IBUPROFEN 600 MG: 600 TABLET, FILM COATED ORAL at 07:54

## 2023-12-31 ASSESSMENT — PAIN SCALES - GENERAL: PAINLEVEL_OUTOF10: 0 - NO PAIN

## 2023-12-31 NOTE — DISCHARGE SUMMARY
Discharge Summary    Admission Date: 2023  Discharge Date: 23  Discharge Diagnosis: Encounter for induction of labor     Patient Active Problem List   Diagnosis    Headache    Poor clinical fetal growth    Encounter for induction of labor       Hospital Course  Lenora Mchugh is a 33 y.o.,     Initially presented for: IOL    Admission Date: 2023    Delivery Date: 2023  10:25 AM     Delivery type: Vaginal, Spontaneous      GA at delivery: 39w3d    Outcome: Living     Anesthesia during delivery: Epidural     Intrapartum complications: None     Feeding method: Breastfeeding Status: Yes    Contraception: Defers contraception to primary OB/PP visit. We discussed pregnancy spacing of at least one year, abstaining from intercourse for 6wks, and the ability to become pregnant in the absence of regular menses. Pt verbalized understanding.     Rhogam: The patient's blood type is A POS. Rhogam is not indicated.     Now postpartum day: 2.    Hospital course n/f:  New #gHTN  - diagnosed by 2 mild range BPs > 4 hours apart  - asymptomatic  - BP normotensive on no meds  - HELLP labs negative and P:C  0.19  - Reviewed patient's risk factors: Grand multip  - BP cuff for home monitoring BID: Given cuff     PP course otherwise uneventful.  Pt desires DC today. Given pt is largely normotensive outside of dx, on no antihypertensives, is asymptomatic, has a BP cuff for home monitoring BID, be seen @ [facility] 1200 for a BP check this week, and will stay at the hospital, OK for pt to DC today per pt preference. I have reviewed with the patient the standard 3 day stay for hypertensive disorders and the risks/benefits of early discharge, including death, stroke, seizure, and readmission.  Meeting all postpartum milestones- ambulating independently, passing flatus, tolerating PO intake, lochia light, voiding spontaneously, and pain well controlled with PO meds. Declines home going motrin and tylenol.    Dispo  OK for  MAMI today  - The signs and symptoms of PEC were reviewed with the patient, including unrelenting headache, vision changes/blurred vision, and RUQ pain  - BP cuff for home for checking BP twice a day  - Pt instructed to call primary OB if SBP > 160 or DBP > 110 or if development of PEC symptoms   - On discharge, follow up with primary OB in:       - 2-5 days for BP check       - 4-6 week for post-partum visit     Pertinent Physical Exam At Time of Discharge  General: well appearing, well nourished, postpartum  Obstetric: fundus firm below umbilicus, lochia light  Skin: Warm, dry; no rashes/lesions/erythema  Breast: No masses, nipple discharge  Neuro: A/Ox3, conversational, no gross motor deficit   GI: no distension, appropriately tender, soft, +BS  Respiratory: Even and unlabored on RA, LSCTA BL  Cardiovascular: Trace BLE edema; No erythema, warmth  Psych: appropriate mood and affect       Your medication list        CONTINUE taking these medications        Instructions Last Dose Given Next Dose Due   cholecalciferol 25 MCG (1000 UT) capsule  Commonly known as: Vitamin D-3           fish oil concentrate 120-180 mg capsule  Commonly known as: Omega-3           L. acidophilus/Bifid. animalis 32 billion cell capsule           magnesium oxide 500 mg capsule           PRENATAL ORAL           sertraline 25 mg tablet  Commonly known as: Zoloft                  STOP taking these medications      doxylamine-pyridoxine (vit B6) 10-10 mg tablet,delayed release (DR/EC)        ondansetron 4 mg tablet  Commonly known as: Zofran                   Outpatient Follow-Up  No future appointments.    Annia Goss APRN-CNP

## 2023-12-31 NOTE — CARE PLAN
The patient's goals for the shift include bond w/ baby    The clinical goals for the shift include no s/sx HDP, meet postpartum milestones    VS stable overnight, no s/sx HDP. Pain well controlled w/ schedueld Motrin. Pt meeting postpartum milestones: ambulating, voiding, bonding w/ infant in NICU, pumping. Lochia and fundus appropriate.

## 2023-12-31 NOTE — CARE PLAN
The patient's goals for the shift include visit infant in NICU    The clinical goals for the shift include maintain BP <160/110    Patient remained free from falls/injury through discharge. Pain well controlled with PO motrin. Lochia WNL, and patient is pumping independently on an appropriate schedule. Patient is bonding with infant in NICU and feels ready to go home.

## 2024-01-02 ENCOUNTER — LACTATION ENCOUNTER (OUTPATIENT)
Age: 34
End: 2024-01-02

## 2024-01-02 NOTE — LACTATION NOTE
This note was copied from a baby's chart.  Lactation Consultant Note  Lactation Consultation   Kayleigh Yi RN, IBCLC    Maternal Information  This is mom's 5th child.  She reports that she has nursed them all for over 2 years.     Maternal Assessment   Experienced breast feeding mom.  Very comfortable with latching baby.    Infant Assessment   Full term infant     Feeding Assessment   During this visit, infant positioned well at mom's right breast in a cross cradle manner.    LATCH TOOL       Breast Pump   Mom reports that she has a pump for home use but that she does not usually use it.    Recommendations/Summary       I spoke with mom at pt's bedside to explained availability of Lactation Consult services. Provided written patient instruction materials and use of Symphony electric breast pump. Mom has been putting the baby to breast.  She was encouraged to pump every 3 hours if baby does not nurse well. Mom had no questions for LC services at this time. Invited to contact Lactation Consult services as needed.

## 2024-01-02 NOTE — SIGNIFICANT EVENT
Follow-up Phone Call Note:   Interview:  Care Type: Women's Health   Phone Number Call  [phone]   Call Outcome: Left Message          Date/Time Of Call: 1/2/24 at 1112   Call Back Done By: care coordinator   Callback Complete:  Yes

## 2024-01-03 LAB
LABORATORY COMMENT REPORT: NORMAL
PATH REPORT.FINAL DX SPEC: NORMAL
PATH REPORT.GROSS SPEC: NORMAL
PATH REPORT.RELEVANT HX SPEC: NORMAL
PATH REPORT.TOTAL CANCER: NORMAL

## 2024-01-08 ENCOUNTER — LACTATION ENCOUNTER (OUTPATIENT)
Age: 34
End: 2024-01-08

## 2024-01-08 NOTE — LACTATION NOTE
This note was copied from a baby's chart.  Lactation Consultant Note  Lactation Consultation       Maternal Information       Maternal Assessment       Infant Assessment       Feeding Assessment       LATCH TOOL       Breast Pump       Other OB Lactation Tools       Patient Follow-up       Other OB Lactation Documentation       Recommendations/Summary  Met with mom.  She reports she exclusively nurses her infant and has only pumped once. Encouraged to bring her milk home. Mom plans to exclusively nurse at home. Reviewed discharge information & provided with  Lactation Consultation Services contact information & [facility] Breastfeeding Hotline phone number.

## 2024-01-11 ENCOUNTER — APPOINTMENT (OUTPATIENT)
Dept: RADIOLOGY | Facility: HOSPITAL | Age: 34
End: 2024-01-11
Payer: COMMERCIAL

## 2024-02-16 ENCOUNTER — OFFICE VISIT (OUTPATIENT)
Dept: OBSTETRICS AND GYNECOLOGY | Facility: CLINIC | Age: 34
End: 2024-02-16
Payer: COMMERCIAL

## 2024-02-16 VITALS
SYSTOLIC BLOOD PRESSURE: 122 MMHG | BODY MASS INDEX: 38.76 KG/M2 | HEART RATE: 74 BPM | DIASTOLIC BLOOD PRESSURE: 82 MMHG | WEIGHT: 247.5 LBS

## 2024-02-16 DIAGNOSIS — Z32.02 PREGNANCY TEST NEGATIVE: ICD-10-CM

## 2024-02-16 DIAGNOSIS — Z30.430 ENCOUNTER FOR IUD INSERTION: Primary | ICD-10-CM

## 2024-02-16 LAB — PREGNANCY TEST URINE, POC: NEGATIVE

## 2024-02-16 PROCEDURE — 1036F TOBACCO NON-USER: CPT | Performed by: OBSTETRICS & GYNECOLOGY

## 2024-02-16 PROCEDURE — 81025 URINE PREGNANCY TEST: CPT | Performed by: OBSTETRICS & GYNECOLOGY

## 2024-02-16 PROCEDURE — 2500000004 HC RX 250 GENERAL PHARMACY W/ HCPCS (ALT 636 FOR OP/ED): Mod: SE | Performed by: OBSTETRICS & GYNECOLOGY

## 2024-02-16 PROCEDURE — 58300 INSERT INTRAUTERINE DEVICE: CPT | Performed by: OBSTETRICS & GYNECOLOGY

## 2024-02-16 PROCEDURE — 99212 OFFICE O/P EST SF 10 MIN: CPT | Performed by: OBSTETRICS & GYNECOLOGY

## 2024-02-16 RX ADMIN — COPPER 1 EACH: 313.4 INTRAUTERINE DEVICE INTRAUTERINE at 14:12

## 2024-02-16 ASSESSMENT — PAIN SCALES - GENERAL: PAINLEVEL: 0-NO PAIN

## 2024-02-16 ASSESSMENT — ENCOUNTER SYMPTOMS
GASTROINTESTINAL NEGATIVE: 0
HEMATOLOGIC/LYMPHATIC NEGATIVE: 0
ALLERGIC/IMMUNOLOGIC NEGATIVE: 0
CONSTITUTIONAL NEGATIVE: 0
EYES NEGATIVE: 0
MUSCULOSKELETAL NEGATIVE: 0
NEUROLOGICAL NEGATIVE: 0
PSYCHIATRIC NEGATIVE: 0
ENDOCRINE NEGATIVE: 0
CARDIOVASCULAR NEGATIVE: 0
RESPIRATORY NEGATIVE: 0

## 2024-02-16 NOTE — PROGRESS NOTES
32 yo  now 7 weeks s/p .  + BF.  Going well, Mood good.  Has had copper IUD 4 x before, after each baby.  Using GOVEA and spermicide.  Here for Paragard IUD placement.   Patient ID: Lenora Mchugh is a 33 y.o. female.    IUD Insertion    Date/Time: 2024 11:10 AM    Performed by: Amara Gutierres MD MPH  Authorized by: Amara Gutierres MD MPH    Consent:     Consent obtained:  Written    Consent given by:  Patient    Procedure risks and benefits discussed: yes      Patient questions answered: yes      Patient agrees, verbalizes understanding, and wants to proceed: yes    Procedure:     Pelvic exam performed: yes      Negative urine pregnancy test: yes      Cervix cleaned and prepped: yes      Speculum placed in vagina: yes      Tenaculum applied to cervix: yes      Uterus sounded: yes      Uterus sound depth (cm):  7    IUD inserted with no complications: yes      IUD type:  ParaGard    Strings trimmed: yes    Post-procedure:     Patient tolerated procedure well: yes      Patient will follow up after next period: yes    Comments:      ParaGardThe patient was placed in the dorsal lithotomy position.  I performed a bimanual exam which showed a anteverted uterus.  The sterile speculum was placed.  intracervical block administered: none 0 cc. The single-tooth tenaculum was placed on the anterior lip of the cervix.  The cervix was prepped with Betadine.  I passed the sterile uterine sound to 7 cm.  The ParaGard IUD was inserted in the usual sterile fashion.  The strings were trimmed to 2 cm.  Hemostasis was assured, and all instruments were removed from the cervix and vagina.  The patient tolerated the procedure well.

## 2024-02-29 ENCOUNTER — APPOINTMENT (OUTPATIENT)
Dept: OBSTETRICS AND GYNECOLOGY | Facility: HOSPITAL | Age: 34
End: 2024-02-29
Payer: COMMERCIAL

## 2024-03-20 ENCOUNTER — APPOINTMENT (OUTPATIENT)
Dept: PRIMARY CARE | Facility: CLINIC | Age: 34
End: 2024-03-20
Payer: COMMERCIAL

## 2024-11-07 ENCOUNTER — TELEPHONE (OUTPATIENT)
Dept: OBSTETRICS AND GYNECOLOGY | Facility: CLINIC | Age: 34
End: 2024-11-07
Payer: COMMERCIAL

## 2024-11-07 DIAGNOSIS — N61.0 MASTITIS: Primary | ICD-10-CM

## 2024-11-07 RX ORDER — DICLOXACILLIN SODIUM 500 MG/1
500 CAPSULE ORAL 4 TIMES DAILY
Qty: 40 CAPSULE | Refills: 0 | Status: SHIPPED | OUTPATIENT
Start: 2024-11-07 | End: 2024-11-17

## 2024-11-07 NOTE — TELEPHONE ENCOUNTER
Called patient to discuss medication sent to pharmacy  Identified by name and   Informed patient of medication sent  Patient verbalized understanding, all questions/concerns addressed at this time.    LIANE Moraes RN    MD Sydney Johnson, RN  Caller: Unspecified (Today, 10:01 AM)  Dicloxacillin Rx sent

## 2024-11-07 NOTE — TELEPHONE ENCOUNTER
Called patient to discuss symptoms she is experiencing  Identified by name and   Fever, 102, red and very sore, right breast,   Tried warm compresses, massage, took tylenol, 2 hours ago, fever is persisting  Symptoms started yesterday afternoon  Discussed that RN will speak with provider about next steps and if we will send in abx to start or if she should go into L&D for eval  Patient verbalized understanding, all questions/concerns addressed at this time.    Sydney Cisneros, NASREENN RN

## 2025-01-29 ENCOUNTER — APPOINTMENT (OUTPATIENT)
Dept: PRIMARY CARE | Facility: CLINIC | Age: 35
End: 2025-01-29
Payer: COMMERCIAL

## 2025-01-29 VITALS
SYSTOLIC BLOOD PRESSURE: 122 MMHG | DIASTOLIC BLOOD PRESSURE: 70 MMHG | OXYGEN SATURATION: 96 % | BODY MASS INDEX: 40.34 KG/M2 | HEART RATE: 86 BPM | WEIGHT: 257 LBS | HEIGHT: 67 IN

## 2025-01-29 DIAGNOSIS — Z00.00 ANNUAL PHYSICAL EXAM: Primary | ICD-10-CM

## 2025-01-29 DIAGNOSIS — E66.01 MORBID OBESITY WITH BMI OF 40.0-44.9, ADULT (MULTI): ICD-10-CM

## 2025-01-29 PROCEDURE — 3008F BODY MASS INDEX DOCD: CPT | Performed by: INTERNAL MEDICINE

## 2025-01-29 PROCEDURE — 99395 PREV VISIT EST AGE 18-39: CPT | Performed by: INTERNAL MEDICINE

## 2025-01-29 PROCEDURE — 1036F TOBACCO NON-USER: CPT | Performed by: INTERNAL MEDICINE

## 2025-01-29 ASSESSMENT — PATIENT HEALTH QUESTIONNAIRE - PHQ9
2. FEELING DOWN, DEPRESSED OR HOPELESS: NOT AT ALL
1. LITTLE INTEREST OR PLEASURE IN DOING THINGS: NOT AT ALL
SUM OF ALL RESPONSES TO PHQ9 QUESTIONS 1 AND 2: 0

## 2025-01-29 NOTE — PROGRESS NOTES
"Subjective   Patient ID: Lenora Mchugh is a 34 y.o. female who presents for Annual Exam (Patient here for complete physical exam).    HPI   The patient presents for an annual wellness exam.    Review of Systems  Weight gain   Objective   /70   Pulse 86   Ht 1.702 m (5' 7\")   Wt 117 kg (257 lb)   SpO2 96%   Breastfeeding Yes   BMI 40.25 kg/m²     Physical Exam  NAD. Cooperative.  HEENT: WNL  Neck: WNL  Lungs CTA  Heart: RRR  Abdomen: WNL  Musculoskeletal system: WNL  Neurologic exam: WNL    Assessment/Plan   Diagnoses and all orders for this visit:  Annual physical exam  Morbid obesity with BMI of 40.0-44.9, adult (Multi)  Discussed weight goals, recommended gradual weight loss with daily caloric reduction in addition to low to moderate intensity exercise as tolerated.  Lab results from 11/24 were reviewed and discussed with the patient.       "

## 2025-05-16 ENCOUNTER — OFFICE VISIT (OUTPATIENT)
Dept: PRIMARY CARE | Facility: CLINIC | Age: 35
End: 2025-05-16
Payer: COMMERCIAL

## 2025-05-16 VITALS
BODY MASS INDEX: 41.35 KG/M2 | SYSTOLIC BLOOD PRESSURE: 125 MMHG | HEART RATE: 72 BPM | OXYGEN SATURATION: 98 % | WEIGHT: 264 LBS | DIASTOLIC BLOOD PRESSURE: 80 MMHG

## 2025-05-16 DIAGNOSIS — E66.01 MORBID OBESITY WITH BMI OF 40.0-44.9, ADULT (MULTI): Primary | ICD-10-CM

## 2025-05-16 PROCEDURE — G8433 SCR FOR DEP NOT CPT DOC RSN: HCPCS | Performed by: INTERNAL MEDICINE

## 2025-05-16 PROCEDURE — 99214 OFFICE O/P EST MOD 30 MIN: CPT | Performed by: INTERNAL MEDICINE

## 2025-05-16 PROCEDURE — 1036F TOBACCO NON-USER: CPT | Performed by: INTERNAL MEDICINE

## 2025-05-16 ASSESSMENT — PATIENT HEALTH QUESTIONNAIRE - PHQ9
SUM OF ALL RESPONSES TO PHQ9 QUESTIONS 1 AND 2: 0
2. FEELING DOWN, DEPRESSED OR HOPELESS: NOT AT ALL
1. LITTLE INTEREST OR PLEASURE IN DOING THINGS: NOT AT ALL

## 2025-05-16 NOTE — PROGRESS NOTES
Subjective   Patient ID: Lenora Mchugh is a 34 y.o. female who presents for Follow-up (Patient here for follow-up visit ).    HPI   The patient presents with C/O weight gain. Recent dietary changes with reduced caloric intake in addition to exercise are not effective. The patient is inquiring about the treatment options, weight and BMI goals.    Review of Systems  Weight gain    Objective   /80   Pulse 72   Wt 120 kg (264 lb)   SpO2 98%   BMI 41.35 kg/m²     Physical Exam  NAD. Cooperative.  Lungs CTA  Heart: RRR  LE: negative     Assessment/Plan   Diagnoses and all orders for this visit:  Morbid obesity with BMI of 40.0-44.9, adult (Multi)  -     tirzepatide, weight loss, (Zepbound) 5 mg/0.5 mL injection; Inject 5 mg under the skin every 7 days.  -     TSH; Future    Discussed weight goals, recommended gradual weight loss with daily caloric reduction in addition to low to moderate intensity exercise as tolerated.

## 2026-01-29 ENCOUNTER — APPOINTMENT (OUTPATIENT)
Dept: PRIMARY CARE | Facility: CLINIC | Age: 36
End: 2026-01-29
Payer: COMMERCIAL